# Patient Record
Sex: FEMALE | Race: WHITE | Employment: PART TIME | URBAN - METROPOLITAN AREA
[De-identification: names, ages, dates, MRNs, and addresses within clinical notes are randomized per-mention and may not be internally consistent; named-entity substitution may affect disease eponyms.]

---

## 2017-05-04 ENCOUNTER — TELEPHONE (OUTPATIENT)
Dept: FAMILY MEDICINE CLINIC | Facility: CLINIC | Age: 52
End: 2017-05-04

## 2017-05-04 DIAGNOSIS — Z00.00 ROUTINE ADULT HEALTH MAINTENANCE: Primary | ICD-10-CM

## 2017-05-04 NOTE — TELEPHONE ENCOUNTER
Patient is calling in she has a physical scheduled for 5/17/17 with Dr. Guzman Burkett and needs labs orders called into [facility].

## 2017-05-04 NOTE — TELEPHONE ENCOUNTER
Pt does not have physical but does still want all labs.   Pt states visit was to be a physical not just 6 mo cholesterol visit

## 2017-05-17 ENCOUNTER — OFFICE VISIT (OUTPATIENT)
Dept: FAMILY MEDICINE CLINIC | Facility: CLINIC | Age: 52
End: 2017-05-17

## 2017-05-17 VITALS
TEMPERATURE: 98 F | RESPIRATION RATE: 16 BRPM | HEART RATE: 80 BPM | WEIGHT: 182 LBS | HEIGHT: 64 IN | BODY MASS INDEX: 31.07 KG/M2 | SYSTOLIC BLOOD PRESSURE: 124 MMHG | DIASTOLIC BLOOD PRESSURE: 72 MMHG

## 2017-05-17 DIAGNOSIS — E78.2 MIXED HYPERLIPIDEMIA: Primary | ICD-10-CM

## 2017-05-17 PROCEDURE — 99213 OFFICE O/P EST LOW 20 MIN: CPT | Performed by: FAMILY MEDICINE

## 2017-05-17 NOTE — PROGRESS NOTES
HPI:   Patient presents with:  Hyperlipidemia: 6 month f/u   Colonoscopy Screening: pt is due  Orders Call: pt due for mammogram      Derick Boss is a 46year old female who presents for recheck of her hypertension.  She has been taking medications a daughter. She has No Known Allergies.        REVIEW OF SYSTEMS:   GENERAL HEALTH: feels well otherwise  SKIN: denies any unusual skin lesions or rashes  RESPIRATORY: denies shortness of breath with exertion  CARDIOVASCULAR: denies chest pain on exertion CPX    Return in about 6 months (around 11/17/2017) for Annual physical.    Diana Cross, 5/17/2017  10:04 AM

## 2017-05-17 NOTE — PATIENT INSTRUCTIONS
Telephone on 05/04/2017  GLUCOSE Date: 05/10/2017   Value: 90  Ref Range 65 - 99 mg/dL Status: Final    Comment:               Fasting reference interval        UREA NITROGEN (BUN) Date: 05/10/2017   Value: 22  Ref Range 7 - 25 mg/dL Status: Final   CREA Range 125 - 200 mg/dL Status: Final   HDL CHOLESTEROL Date: 05/10/2017   Value: 59  Ref Range > OR = 46 mg/dL Status: Final   TRIGLYCERIDES Date: 05/10/2017   Value: 70  Ref Range <150 mg/dL Status: Final   LDL-CHOLESTEROL Date: 05/10/2017   Value: 115  Re BASOPHILS Date: 05/10/2017   Value: 34  Ref Range 0 - 200 cells/uL Status: Final   NEUTROPHILS Date: 05/10/2017   Value: 60.3  Status: Final   LYMPHOCYTES Date: 05/10/2017   Value: 28.8  Status: Final   MONOCYTES Date: 05/10/2017   Value: 5.4  Status: Meme Lyons

## 2017-11-16 ENCOUNTER — TELEPHONE (OUTPATIENT)
Dept: FAMILY MEDICINE CLINIC | Facility: CLINIC | Age: 52
End: 2017-11-16

## 2017-11-16 DIAGNOSIS — Z00.00 LABORATORY EXAMINATION ORDERED AS PART OF A ROUTINE GENERAL MEDICAL EXAMINATION: Primary | ICD-10-CM

## 2017-12-19 ENCOUNTER — TELEPHONE (OUTPATIENT)
Dept: FAMILY MEDICINE CLINIC | Facility: CLINIC | Age: 52
End: 2017-12-19

## 2018-01-11 ENCOUNTER — HOSPITAL (OUTPATIENT)
Dept: OTHER | Age: 53
End: 2018-01-11
Attending: OBSTETRICS & GYNECOLOGY

## 2018-01-25 ENCOUNTER — OFFICE VISIT (OUTPATIENT)
Dept: FAMILY MEDICINE CLINIC | Facility: CLINIC | Age: 53
End: 2018-01-25

## 2018-01-25 VITALS
BODY MASS INDEX: 31.92 KG/M2 | RESPIRATION RATE: 14 BRPM | HEIGHT: 64.25 IN | HEART RATE: 72 BPM | DIASTOLIC BLOOD PRESSURE: 70 MMHG | SYSTOLIC BLOOD PRESSURE: 120 MMHG | WEIGHT: 187 LBS | TEMPERATURE: 98 F

## 2018-01-25 DIAGNOSIS — Z00.00 ANNUAL PHYSICAL EXAM: Primary | ICD-10-CM

## 2018-01-25 DIAGNOSIS — E78.2 MIXED HYPERLIPIDEMIA: ICD-10-CM

## 2018-01-25 DIAGNOSIS — Z12.11 SCREEN FOR COLON CANCER: ICD-10-CM

## 2018-01-25 PROCEDURE — 99396 PREV VISIT EST AGE 40-64: CPT | Performed by: FAMILY MEDICINE

## 2018-01-25 NOTE — PROGRESS NOTES
Jennifer Anderson is a 46year old female who presents for a complete physical exam.   HPI:   Patient presents with:  Physical: WWE no pap      Her last annual assessment has been over 1 year: Annual Physical due on 08/28/2015          Symptoms: is menopa No results found for: A1C, VITD       Last [place] category :Mammogram,1 Yr due on 01/01/2019   [unfilled]  No results found.         Current Outpatient Prescriptions on File Prior to Visit:  Cod Liver Oil 1000 MG Oral Cap Take by mouth 2 (two) times d Date)   BMI 31.85 kg/m²  Estimated body mass index is 31.85 kg/m² as calculated from the following:    Height as of this encounter: 64.25\". Weight as of this encounter: 187 lb. Physical Exam   Nursing note and vitals reviewed.    Constitutional: She i clubbing. Psychiatric: She has a normal mood and affect.  Her speech is normal and behavior is normal. Judgment and thought content normal. Cognition and memory are normal.        ASSESSMENT AND PLAN:   Jaleesa Puga is a 46year old female who prese

## 2018-07-18 ENCOUNTER — TELEPHONE (OUTPATIENT)
Dept: FAMILY MEDICINE CLINIC | Facility: CLINIC | Age: 53
End: 2018-07-18

## 2018-07-19 NOTE — TELEPHONE ENCOUNTER
Called and talked to patient she is feeling better now not wheezing anymore and the burning has decreased she feels it might be from working a double yesterday, she will call if this comes back

## 2018-10-11 ENCOUNTER — TELEPHONE (OUTPATIENT)
Dept: FAMILY MEDICINE CLINIC | Facility: CLINIC | Age: 53
End: 2018-10-11

## 2018-10-11 NOTE — TELEPHONE ENCOUNTER
Pt made physical appt and would like a blood work order sent to [address] [place] [address].

## 2019-01-09 ENCOUNTER — TELEPHONE (OUTPATIENT)
Dept: FAMILY MEDICINE CLINIC | Facility: CLINIC | Age: 54
End: 2019-01-09

## 2019-01-09 DIAGNOSIS — Z00.00 LABORATORY EXAMINATION ORDERED AS PART OF A ROUTINE GENERAL MEDICAL EXAMINATION: ICD-10-CM

## 2019-01-09 DIAGNOSIS — Z12.31 VISIT FOR SCREENING MAMMOGRAM: ICD-10-CM

## 2019-01-09 NOTE — TELEPHONE ENCOUNTER
Please enter lab orders for the patient's upcoming physical appointment. Physical scheduled:    Your appointments     Date & Time Appointment Department [facility])    Jan 30, 2019  9:30 AM CST Physical - Established Patient with MD Riki John

## 2019-01-24 LAB
ALBUMIN/GLOBULIN RATIO: 1.6 (CALC) (ref 1–2.5)
ALBUMIN: 4.5 G/DL (ref 3.6–5.1)
ALKALINE PHOSPHATASE: 56 U/L (ref 33–130)
ALT: 9 U/L (ref 6–29)
AST: 12 U/L (ref 10–35)
BILIRUBIN, TOTAL: 0.6 MG/DL (ref 0.2–1.2)
BUN: 19 MG/DL (ref 7–25)
CALCIUM: 9.7 MG/DL (ref 8.6–10.4)
CARBON DIOXIDE: 29 MMOL/L (ref 20–32)
CHLORIDE: 103 MMOL/L (ref 98–110)
CHOL/HDLC RATIO: 3.1 (CALC)
CHOLESTEROL, TOTAL: 194 MG/DL
CREATININE: 0.76 MG/DL (ref 0.5–1.05)
EGFR IF AFRICN AM: 104 ML/MIN/1.73M2
EGFR IF NONAFRICN AM: 90 ML/MIN/1.73M2
GLOBULIN: 2.8 G/DL (CALC) (ref 1.9–3.7)
GLUCOSE: 87 MG/DL (ref 65–99)
HDL CHOLESTEROL: 63 MG/DL
HEMATOCRIT: 41.9 % (ref 35–45)
HEMOGLOBIN: 13.7 G/DL (ref 11.7–15.5)
LDL-CHOLESTEROL: 115 MG/DL (CALC)
MCH: 28.6 PG (ref 27–33)
MCHC: 32.7 G/DL (ref 32–36)
MCV: 87.5 FL (ref 80–100)
NON-HDL CHOLESTEROL: 131 MG/DL (CALC)
POTASSIUM: 4.6 MMOL/L (ref 3.5–5.3)
PROTEIN, TOTAL: 7.3 G/DL (ref 6.1–8.1)
RDW: 12.7 % (ref 11–15)
RED BLOOD CELL COUNT: 4.79 MILLION/UL (ref 3.8–5.1)
SODIUM: 140 MMOL/L (ref 135–146)
TRIGLYCERIDES: 70 MG/DL
TSH W/REFLEX TO FT4: 1.9 MIU/L
WHITE BLOOD CELL COUNT: 5.1 THOUSAND/UL (ref 3.8–10.8)

## 2019-02-01 ENCOUNTER — OFFICE VISIT (OUTPATIENT)
Dept: FAMILY MEDICINE CLINIC | Facility: CLINIC | Age: 54
End: 2019-02-01
Payer: COMMERCIAL

## 2019-02-01 VITALS
DIASTOLIC BLOOD PRESSURE: 70 MMHG | BODY MASS INDEX: 29.68 KG/M2 | RESPIRATION RATE: 12 BRPM | TEMPERATURE: 97 F | SYSTOLIC BLOOD PRESSURE: 122 MMHG | HEART RATE: 76 BPM | WEIGHT: 176 LBS | HEIGHT: 64.5 IN

## 2019-02-01 DIAGNOSIS — E78.2 MIXED HYPERLIPIDEMIA: ICD-10-CM

## 2019-02-01 DIAGNOSIS — Z00.00 ANNUAL PHYSICAL EXAM: Primary | ICD-10-CM

## 2019-02-01 PROCEDURE — 99396 PREV VISIT EST AGE 40-64: CPT | Performed by: FAMILY MEDICINE

## 2019-02-01 NOTE — PROGRESS NOTES
Pelon Hartley is a 48year old female who presents for a complete physical exam.   HPI:   Patient presents with:  Physical: no pap      Her last annual assessment has been over 1 year: Annual Physical due on 01/25/2019          Symptoms: is menopausal No results found for: A1C, VITD       Last [place] category :Mammogram due on 01/01/2019   [unfilled]  No results found. Current Outpatient Medications on File Prior to Visit:  Cod Liver Oil 1000 MG Oral Cap Take by mouth 2 (two) times daily. as calculated from the following:    Height as of this encounter: 64.5\". Weight as of this encounter: 176 lb. Physical Exam   Nursing note and vitals reviewed. Constitutional: She is oriented to person, place, and time.  Vital signs are normal. She is normal and behavior is normal. Judgment and thought content normal. Cognition and memory are normal.        ASSESSMENT AND PLAN:   Renetta Perez is a 48year old female who presents for a complete physical exam.   Pt's weight is Body mass index is

## 2019-03-06 ENCOUNTER — HOSPITAL (OUTPATIENT)
Dept: OTHER | Age: 54
End: 2019-03-06
Attending: OBSTETRICS & GYNECOLOGY

## 2020-01-01 ENCOUNTER — EXTERNAL RECORD (OUTPATIENT)
Dept: HEALTH INFORMATION MANAGEMENT | Facility: OTHER | Age: 55
End: 2020-01-01

## 2020-06-05 ENCOUNTER — TELEPHONE (OUTPATIENT)
Dept: FAMILY MEDICINE CLINIC | Facility: CLINIC | Age: 55
End: 2020-06-05

## 2020-06-05 DIAGNOSIS — Z00.00 LABORATORY EXAMINATION ORDERED AS PART OF A ROUTINE GENERAL MEDICAL EXAMINATION: ICD-10-CM

## 2020-06-05 DIAGNOSIS — Z12.31 VISIT FOR SCREENING MAMMOGRAM: ICD-10-CM

## 2020-06-05 NOTE — TELEPHONE ENCOUNTER
Diagnoses and all orders for this visit:    Visit for screening mammogram  -     [facility] SCREENING BILAT (CPT=77067);  Future    Laboratory examination ordered as part of a routine general medical examination  -     COMP METABOLIC PANEL (14)  -     LIPID PANEL

## 2020-06-05 NOTE — TELEPHONE ENCOUNTER
Please enter lab orders for the patient's upcoming physical appointment. Physical scheduled:    Your appointments     Date & Time Appointment Department [facility])    Jun 24, 2020 11:00 AM CDT Physical - Established with Juana Schneider MD [facility]

## 2020-06-23 ENCOUNTER — APPOINTMENT (OUTPATIENT)
Dept: MAMMOGRAPHY | Age: 55
End: 2020-06-23
Attending: OBSTETRICS & GYNECOLOGY

## 2020-06-24 ENCOUNTER — OFFICE VISIT (OUTPATIENT)
Dept: FAMILY MEDICINE CLINIC | Facility: CLINIC | Age: 55
End: 2020-06-24
Payer: COMMERCIAL

## 2020-06-24 VITALS
TEMPERATURE: 98 F | SYSTOLIC BLOOD PRESSURE: 110 MMHG | HEART RATE: 60 BPM | WEIGHT: 174 LBS | BODY MASS INDEX: 29.35 KG/M2 | DIASTOLIC BLOOD PRESSURE: 60 MMHG | RESPIRATION RATE: 16 BRPM | HEIGHT: 64.5 IN

## 2020-06-24 DIAGNOSIS — Z00.00 ANNUAL PHYSICAL EXAM: Primary | ICD-10-CM

## 2020-06-24 DIAGNOSIS — E78.2 MIXED HYPERLIPIDEMIA: ICD-10-CM

## 2020-06-24 PROCEDURE — 99396 PREV VISIT EST AGE 40-64: CPT | Performed by: FAMILY MEDICINE

## 2020-06-24 NOTE — PROGRESS NOTES
Wes Petty is a 54year old female who presents for a complete physical exam.   HPI:   Patient presents with:  Physical: pap done with gyne     Her last annual assessment has been over 1 year: Annual Physical due on 02/01/2020          Symptoms: is (H) 06/17/2020 09:11 AM    NONHDLC 151 (H) 06/17/2020 09:11 AM       No results found for: A1C, VITD       Last [place] category :Mammogram due on 01/01/2019   [unfilled]  No results found. Coenzyme Q10 (COQ10 OR), Take by mouth.   Probiotic Product (SC 110/60 (BP Location: Left arm)   Pulse 60   Temp 98.2 °F (36.8 °C)   Resp 16   Ht 64.5\"   Wt 174 lb (78.9 kg)   BMI 29.41 kg/m²  Estimated body mass index is 29.41 kg/m² as calculated from the following:    Height as of this encounter: 64.5\".     Weight a Skin: Skin is warm, dry and intact. No rash noted. She is not diaphoretic. No cyanosis or erythema. Nails show no clubbing. Psychiatric: She has a normal mood and affect.  Her speech is normal and behavior is normal. Judgment and thought content normal.

## 2020-06-27 ENCOUNTER — APPOINTMENT (OUTPATIENT)
Dept: MAMMOGRAPHY | Age: 55
End: 2020-06-27
Attending: OBSTETRICS & GYNECOLOGY

## 2020-07-18 ENCOUNTER — APPOINTMENT (OUTPATIENT)
Dept: MAMMOGRAPHY | Age: 55
End: 2020-07-18
Attending: OBSTETRICS & GYNECOLOGY

## 2020-07-21 ENCOUNTER — PATIENT OUTREACH (OUTPATIENT)
Dept: FAMILY MEDICINE CLINIC | Facility: CLINIC | Age: 55
End: 2020-07-21

## 2020-07-24 ENCOUNTER — TELEPHONE (OUTPATIENT)
Dept: FAMILY MEDICINE CLINIC | Facility: CLINIC | Age: 55
End: 2020-07-24

## 2020-07-24 ENCOUNTER — LAB ENCOUNTER (OUTPATIENT)
Dept: LAB | Facility: HOSPITAL | Age: 55
End: 2020-07-24
Attending: FAMILY MEDICINE
Payer: COMMERCIAL

## 2020-07-24 DIAGNOSIS — Z20.822 ENCOUNTER FOR SCREENING LABORATORY TESTING FOR COVID-19 VIRUS IN ASYMPTOMATIC PATIENT: Primary | ICD-10-CM

## 2020-07-24 DIAGNOSIS — Z20.822 ENCOUNTER FOR SCREENING LABORATORY TESTING FOR COVID-19 VIRUS IN ASYMPTOMATIC PATIENT: ICD-10-CM

## 2020-07-24 NOTE — TELEPHONE ENCOUNTER
Patient recently found out her  tested positive for covid. 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 He didn't have symptoms but is concerned.

## 2020-07-25 LAB — SARS-COV-2 RNA RESP QL NAA+PROBE: NOT DETECTED

## 2020-07-27 ENCOUNTER — HOSPITAL ENCOUNTER (OUTPATIENT)
Dept: MAMMOGRAPHY | Age: 55
Discharge: HOME | End: 2020-07-27
Attending: OBSTETRICS & GYNECOLOGY

## 2020-07-27 DIAGNOSIS — Z12.31 BREAST CANCER SCREENING BY MAMMOGRAM: ICD-10-CM

## 2020-07-27 PROCEDURE — 77067 SCR MAMMO BI INCL CAD: CPT

## 2020-08-04 ENCOUNTER — TELEPHONE (OUTPATIENT)
Dept: FAMILY MEDICINE CLINIC | Facility: CLINIC | Age: 55
End: 2020-08-04

## 2020-08-04 NOTE — TELEPHONE ENCOUNTER
Patient is calling because she has returned back to work after having a negative covid test. Patient is concerned if her manager should have had another test done after testing positive and being off for 14 days.  Patient wants to know if it is safe for her

## 2020-10-14 ENCOUNTER — TELEPHONE (OUTPATIENT)
Dept: FAMILY MEDICINE CLINIC | Facility: CLINIC | Age: 55
End: 2020-10-14

## 2020-10-14 NOTE — TELEPHONE ENCOUNTER
Explained to Pt that our testing for Asymptomatic Pt is 5-7-days, so it would not be sooner. Advised other testing sites. Pt was in last contact with + person 5 days ago. Pt has no symptoms.   COVID-19 Testing:  Here is an updated list of other testing fa [email or web address]   6. [facility] COVID-[facility] Testing: Multiple locations. Appointment required. No doctor's order needed.  Find testing locations and schedule appointment at [employer]-

## 2020-10-14 NOTE — TELEPHONE ENCOUNTER
Pt requesting we place order for Covid test as an employer tested positive.  Was giving her the locations but she thinks it would be faster if we place order

## 2020-10-22 ENCOUNTER — OFFICE VISIT (OUTPATIENT)
Dept: OBGYN | Age: 55
End: 2020-10-22

## 2020-10-22 VITALS
HEIGHT: 65 IN | SYSTOLIC BLOOD PRESSURE: 110 MMHG | BODY MASS INDEX: 29.82 KG/M2 | WEIGHT: 179 LBS | DIASTOLIC BLOOD PRESSURE: 72 MMHG

## 2020-10-22 DIAGNOSIS — Z12.4 SCREENING FOR MALIGNANT NEOPLASM OF CERVIX: Primary | ICD-10-CM

## 2020-10-22 DIAGNOSIS — Z01.419 GYNECOLOGIC EXAM NORMAL: ICD-10-CM

## 2020-10-22 PROCEDURE — 99396 PREV VISIT EST AGE 40-64: CPT | Performed by: OBSTETRICS & GYNECOLOGY

## 2020-10-22 RX ORDER — BACILLUS COAGULANS/INULIN 1B-250 MG
CAPSULE ORAL
COMMUNITY
End: 2023-05-23 | Stop reason: ALTCHOICE

## 2020-10-22 RX ORDER — VITAMIN B COMPLEX
TABLET ORAL DAILY
COMMUNITY
End: 2023-05-23 | Stop reason: ALTCHOICE

## 2020-10-22 RX ORDER — ASCORBIC ACID 1000 MG
TABLET ORAL
COMMUNITY
End: 2023-05-23 | Stop reason: ALTCHOICE

## 2020-10-22 SDOH — HEALTH STABILITY: MENTAL HEALTH: HOW OFTEN DO YOU HAVE A DRINK CONTAINING ALCOHOL?: 2-4 TIMES A MONTH

## 2020-10-22 SDOH — HEALTH STABILITY: PHYSICAL HEALTH: ON AVERAGE, HOW MANY MINUTES DO YOU ENGAGE IN EXERCISE AT THIS LEVEL?: 60 MIN

## 2020-10-22 SDOH — HEALTH STABILITY: PHYSICAL HEALTH: ON AVERAGE, HOW MANY DAYS PER WEEK DO YOU ENGAGE IN MODERATE TO STRENUOUS EXERCISE (LIKE A BRISK WALK)?: 4 DAYS

## 2020-10-22 ASSESSMENT — PATIENT HEALTH QUESTIONNAIRE - PHQ9
CLINICAL INTERPRETATION OF PHQ2 SCORE: NO FURTHER SCREENING NEEDED
SUM OF ALL RESPONSES TO PHQ9 QUESTIONS 1 AND 2: 0
1. LITTLE INTEREST OR PLEASURE IN DOING THINGS: NOT AT ALL
SUM OF ALL RESPONSES TO PHQ9 QUESTIONS 1 AND 2: 0
CLINICAL INTERPRETATION OF PHQ9 SCORE: NO FURTHER SCREENING NEEDED
2. FEELING DOWN, DEPRESSED OR HOPELESS: NOT AT ALL

## 2020-10-25 ASSESSMENT — ENCOUNTER SYMPTOMS
CONSTITUTIONAL NEGATIVE: 1
RESPIRATORY NEGATIVE: 1
HEMATOLOGIC/LYMPHATIC NEGATIVE: 1
ENDOCRINE NEGATIVE: 1

## 2020-10-26 LAB — CYTOLOGY CVX/VAG DOC THIN PREP: NORMAL

## 2020-10-28 ENCOUNTER — VIRTUAL PHONE E/M (OUTPATIENT)
Dept: FAMILY MEDICINE CLINIC | Facility: CLINIC | Age: 55
End: 2020-10-28
Payer: COMMERCIAL

## 2020-10-28 DIAGNOSIS — J02.9 SORE THROAT: ICD-10-CM

## 2020-10-28 DIAGNOSIS — Z20.822 SUSPECTED COVID-19 VIRUS INFECTION: Primary | ICD-10-CM

## 2020-10-28 PROCEDURE — 99213 OFFICE O/P EST LOW 20 MIN: CPT | Performed by: FAMILY MEDICINE

## 2020-10-28 NOTE — PROGRESS NOTES
Virtual Check-In    Santa Li verbally consents to a [employer] on 10/28/20. Patient understands and accepts financial responsibility for any deductible, co-insurance and/or co-pays associated with this service.     Duration of the s

## 2020-10-29 ENCOUNTER — LAB ENCOUNTER (OUTPATIENT)
Dept: LAB | Age: 55
End: 2020-10-29
Attending: FAMILY MEDICINE
Payer: COMMERCIAL

## 2020-10-29 DIAGNOSIS — J02.9 SORE THROAT: ICD-10-CM

## 2020-10-29 DIAGNOSIS — Z20.822 SUSPECTED COVID-19 VIRUS INFECTION: ICD-10-CM

## 2020-11-04 ENCOUNTER — VIRTUAL PHONE E/M (OUTPATIENT)
Dept: FAMILY MEDICINE CLINIC | Facility: CLINIC | Age: 55
End: 2020-11-04
Payer: COMMERCIAL

## 2020-11-04 DIAGNOSIS — U07.1 COVID-19 VIRUS INFECTION: Primary | ICD-10-CM

## 2020-11-04 PROCEDURE — 99213 OFFICE O/P EST LOW 20 MIN: CPT | Performed by: FAMILY MEDICINE

## 2020-11-04 NOTE — PROGRESS NOTES
Virtual Check-In    [place] verbally consents to a [employer] on 11/04/20. Patient understands and accepts financial responsibility for any deductible, co-insurance and/or co-pays associated with this service.     Duration of the s

## 2021-03-23 ENCOUNTER — TELEPHONE (OUTPATIENT)
Dept: FAMILY MEDICINE CLINIC | Facility: CLINIC | Age: 56
End: 2021-03-23

## 2021-03-23 NOTE — TELEPHONE ENCOUNTER
Patient is due for a Pap please call patient and schedule them for an appointment    Thank you.      Routed to [facility]

## 2021-03-29 ENCOUNTER — TELEPHONE (OUTPATIENT)
Dept: FAMILY MEDICINE CLINIC | Facility: CLINIC | Age: 56
End: 2021-03-29

## 2021-03-29 DIAGNOSIS — Z12.31 VISIT FOR SCREENING MAMMOGRAM: ICD-10-CM

## 2021-03-29 DIAGNOSIS — Z00.00 LABORATORY EXAMINATION ORDERED AS PART OF A ROUTINE GENERAL MEDICAL EXAMINATION: ICD-10-CM

## 2021-03-29 NOTE — TELEPHONE ENCOUNTER
Please enter lab orders for the patient's upcoming physical appointment. Physical scheduled:    Your appointments     Date & Time Appointment Department [facility])    Jun 24, 2021 10:00 AM CDT Physical - Established with Minnie García  [address]  ([address])            [address] [place] [address] HEART [facility],  [address]  [place] [phone]         Preferred lab: [facility]     Please notify pt once labs have been placed to remind her

## 2021-03-29 NOTE — TELEPHONE ENCOUNTER
LM to advise labs were placed they are fasting labs to fast for 10-12 hours. Advised on mammogram being ordered as well.

## 2021-03-29 NOTE — TELEPHONE ENCOUNTER
Diagnoses and all orders for this visit:    Visit for screening mammogram  -     [facility] SCREENING BILAT (CPT=77067); Future    Laboratory examination ordered as part of a routine general medical examination  -     COMP METABOLIC PANEL (14)  -     LIPID PANEL  -     CBC, PLATELET; NO DIFFERENTIAL  -     TSH W REFLEX TO FREE T4         OK to notify.  Thanks, Lorna Fernandes MD

## 2021-04-20 ENCOUNTER — TELEPHONE (OUTPATIENT)
Dept: FAMILY MEDICINE CLINIC | Facility: CLINIC | Age: 56
End: 2021-04-20

## 2021-08-11 ENCOUNTER — OFFICE VISIT (OUTPATIENT)
Dept: FAMILY MEDICINE CLINIC | Facility: CLINIC | Age: 56
End: 2021-08-11
Payer: COMMERCIAL

## 2021-08-11 ENCOUNTER — TELEPHONE (OUTPATIENT)
Dept: FAMILY MEDICINE CLINIC | Facility: CLINIC | Age: 56
End: 2021-08-11

## 2021-08-11 VITALS
HEART RATE: 66 BPM | DIASTOLIC BLOOD PRESSURE: 80 MMHG | SYSTOLIC BLOOD PRESSURE: 128 MMHG | WEIGHT: 173.38 LBS | BODY MASS INDEX: 27.86 KG/M2 | RESPIRATION RATE: 12 BRPM | HEIGHT: 66 IN

## 2021-08-11 DIAGNOSIS — E78.2 MIXED HYPERLIPIDEMIA: ICD-10-CM

## 2021-08-11 DIAGNOSIS — Z00.00 ANNUAL PHYSICAL EXAM: Primary | ICD-10-CM

## 2021-08-11 LAB
ALBUMIN/GLOBULIN RATIO: 1.5 (CALC) (ref 1–2.5)
ALBUMIN: 4.5 G/DL (ref 3.6–5.1)
ALKALINE PHOSPHATASE: 57 U/L (ref 37–153)
ALT: 10 U/L (ref 6–29)
AST: 12 U/L (ref 10–35)
BILIRUBIN, TOTAL: 0.8 MG/DL (ref 0.2–1.2)
BUN: 21 MG/DL (ref 7–25)
CALCIUM: 9.5 MG/DL (ref 8.6–10.4)
CARBON DIOXIDE: 28 MMOL/L (ref 20–32)
CHLORIDE: 103 MMOL/L (ref 98–110)
CHOL/HDLC RATIO: 3.4 (CALC)
CHOLESTEROL, TOTAL: 201 MG/DL
CREATININE: 0.75 MG/DL (ref 0.5–1.05)
EGFR IF AFRICN AM: 103 ML/MIN/1.73M2
EGFR IF NONAFRICN AM: 89 ML/MIN/1.73M2
GLOBULIN: 3 G/DL (CALC) (ref 1.9–3.7)
GLUCOSE: 92 MG/DL (ref 65–99)
HDL CHOLESTEROL: 59 MG/DL
HEMATOCRIT: 40.5 % (ref 35–45)
HEMOGLOBIN: 13.2 G/DL (ref 11.7–15.5)
LDL-CHOLESTEROL: 122 MG/DL (CALC)
MCH: 28.6 PG (ref 27–33)
MCHC: 32.6 G/DL (ref 32–36)
MCV: 87.7 FL (ref 80–100)
NON-HDL CHOLESTEROL: 142 MG/DL (CALC)
POTASSIUM: 5 MMOL/L (ref 3.5–5.3)
PROTEIN, TOTAL: 7.5 G/DL (ref 6.1–8.1)
RDW: 13.1 % (ref 11–15)
RED BLOOD CELL COUNT: 4.62 MILLION/UL (ref 3.8–5.1)
SODIUM: 141 MMOL/L (ref 135–146)
TRIGLYCERIDES: 98 MG/DL
TSH W/REFLEX TO FT4: 3 MIU/L (ref 0.4–4.5)
WHITE BLOOD CELL COUNT: 5.3 THOUSAND/UL (ref 3.8–10.8)

## 2021-08-11 PROCEDURE — 3074F SYST BP LT 130 MM HG: CPT | Performed by: FAMILY MEDICINE

## 2021-08-11 PROCEDURE — 3008F BODY MASS INDEX DOCD: CPT | Performed by: FAMILY MEDICINE

## 2021-08-11 PROCEDURE — 99396 PREV VISIT EST AGE 40-64: CPT | Performed by: FAMILY MEDICINE

## 2021-08-11 PROCEDURE — 3079F DIAST BP 80-89 MM HG: CPT | Performed by: FAMILY MEDICINE

## 2021-08-11 NOTE — PROGRESS NOTES
Barb Bone is a 64year old female who presents for a complete physical exam.     had concerns including Physical (WWE, see's gyn, no pap ) and Follow Up To Mammogram (wants to discuss on how she can get that scheduled ).    Her last annual assessme 07/27/2021   [unfilled]   No results found for this or any previous visit.      Colonoscopy due on 04/12/2028   No recommendations at this time   COVID-19 Vaccine(1) Never done  Zoster Vaccines(1 of 2) Never done  Annual Depression Screen due on 06/24/2021 level: Not on file    Occupational History      Occupation:         Employer: [employer]        Comment: [email or web address]    Tobacco Use      Smoking status: Never Smoker      Smokeless tobacco: Never Used    Vaping Use      Vaping Use Normal heart sounds, S1 normal and S2 normal. No murmur heard. Pulmonary:      Effort: Pulmonary effort is normal.      Breath sounds: Normal breath sounds. Abdominal:      General: Abdomen is flat. Bowel sounds are normal. There is no distension. Cholecalciferol.    Return in about 1 year (around 8/11/2022) for Annual physical.

## 2021-08-11 NOTE — TELEPHONE ENCOUNTER
Please enter lab orders for the patient's upcoming physical appointment. Physical scheduled:    Your appointments     Date & Time Appointment Department [facility])    Aug 10, 2022  9:30 AM CDT Physical - Established with Juli Martines  [facility]

## 2021-08-16 ENCOUNTER — TELEPHONE (OUTPATIENT)
Dept: FAMILY MEDICINE CLINIC | Facility: CLINIC | Age: 56
End: 2021-08-16

## 2021-08-16 ENCOUNTER — OFFICE VISIT (OUTPATIENT)
Dept: FAMILY MEDICINE CLINIC | Facility: CLINIC | Age: 56
End: 2021-08-16
Payer: COMMERCIAL

## 2021-08-16 VITALS — HEIGHT: 66 IN | WEIGHT: 173 LBS | BODY MASS INDEX: 27.8 KG/M2

## 2021-08-16 DIAGNOSIS — R39.9 UTI SYMPTOMS: Primary | ICD-10-CM

## 2021-08-16 LAB
APPEARANCE: CLEAR
BILIRUBIN: NEGATIVE
GLUCOSE (URINE DIPSTICK): NEGATIVE MG/DL
KETONES (URINE DIPSTICK): NEGATIVE MG/DL
MULTISTIX LOT#: ABNORMAL NUMERIC
NITRITE, URINE: NEGATIVE
PH, URINE: 7 (ref 4.5–8)
PROTEIN (URINE DIPSTICK): NEGATIVE MG/DL
SPECIFIC GRAVITY: 1.01 (ref 1–1.03)
URINE-COLOR: YELLOW
UROBILINOGEN,SEMI-QN: 0.2 MG/DL (ref 0–1.9)

## 2021-08-16 PROCEDURE — 99213 OFFICE O/P EST LOW 20 MIN: CPT | Performed by: FAMILY MEDICINE

## 2021-08-16 PROCEDURE — 3008F BODY MASS INDEX DOCD: CPT | Performed by: FAMILY MEDICINE

## 2021-08-16 PROCEDURE — 81003 URINALYSIS AUTO W/O SCOPE: CPT | Performed by: FAMILY MEDICINE

## 2021-08-16 RX ORDER — SULFAMETHOXAZOLE AND TRIMETHOPRIM 800; 160 MG/1; MG/1
1 TABLET ORAL 2 TIMES DAILY
Qty: 14 TABLET | Refills: 0 | Status: SHIPPED | OUTPATIENT
Start: 2021-08-16 | End: 2021-08-23

## 2021-08-16 NOTE — TELEPHONE ENCOUNTER
Pt c/o urinary burning, pressure after urinating. Urine appears cloudy.     Future Appointments   Date Time Provider [facility]   8/16/2021  1:45 PM Aileen Parisi MD [facility]   8/10/2022  9:30 AM Aileen Parisi MD [facility]

## 2021-08-16 NOTE — PROGRESS NOTES
Gaston Goyal is a 64year old female coming in for had concerns including Urinary (patient says it burning, says urine it was cloudy, just wants to check and see if its a possible UTI). HPI/Subjective:   Urinary  This is a new problem.  The current episode start

## 2021-08-18 ENCOUNTER — TELEPHONE (OUTPATIENT)
Dept: FAMILY MEDICINE CLINIC | Facility: CLINIC | Age: 56
End: 2021-08-18

## 2021-08-18 NOTE — TELEPHONE ENCOUNTER
Reviewed results/recopmmednations with Pt.  Explained we will call her if any change is needed with antibiotic

## 2021-08-18 NOTE — TELEPHONE ENCOUNTER
I have prescribed Bactrim at your recent visit. It is coming back as a positive infection but sensitivities are not back yet so okay to start the medication.

## 2021-08-18 NOTE — TELEPHONE ENCOUNTER
Pt did  a Urine test to determine how to take her medication and is awaiting on her results to see what to do? Informed that no results in yet.  Please call when results are in please

## 2021-10-28 ENCOUNTER — APPOINTMENT (OUTPATIENT)
Dept: OBGYN | Age: 56
End: 2021-10-28

## 2022-05-12 ENCOUNTER — OFFICE VISIT (OUTPATIENT)
Dept: OBGYN | Age: 57
End: 2022-05-12

## 2022-05-12 VITALS
DIASTOLIC BLOOD PRESSURE: 82 MMHG | HEART RATE: 72 BPM | SYSTOLIC BLOOD PRESSURE: 150 MMHG | TEMPERATURE: 97.6 F | BODY MASS INDEX: 29.57 KG/M2 | WEIGHT: 184 LBS | HEIGHT: 66 IN

## 2022-05-12 DIAGNOSIS — Z12.4 CERVICAL CANCER SCREENING: ICD-10-CM

## 2022-05-12 DIAGNOSIS — Z01.419 WOMEN'S ANNUAL ROUTINE GYNECOLOGICAL EXAMINATION: Primary | ICD-10-CM

## 2022-05-12 DIAGNOSIS — R03.0 ELEVATED BLOOD-PRESSURE READING WITHOUT DIAGNOSIS OF HYPERTENSION: ICD-10-CM

## 2022-05-12 PROCEDURE — 99396 PREV VISIT EST AGE 40-64: CPT | Performed by: OBSTETRICS & GYNECOLOGY

## 2022-05-12 RX ORDER — CALCIUM CARBONATE 500(1250)
1000 TABLET ORAL
COMMUNITY
End: 2023-05-23 | Stop reason: ALTCHOICE

## 2022-05-12 ASSESSMENT — PATIENT HEALTH QUESTIONNAIRE - PHQ9
SUM OF ALL RESPONSES TO PHQ9 QUESTIONS 1 AND 2: 0
CLINICAL INTERPRETATION OF PHQ2 SCORE: NO FURTHER SCREENING NEEDED
1. LITTLE INTEREST OR PLEASURE IN DOING THINGS: NOT AT ALL
SUM OF ALL RESPONSES TO PHQ9 QUESTIONS 1 AND 2: 0
2. FEELING DOWN, DEPRESSED OR HOPELESS: NOT AT ALL

## 2022-06-02 ENCOUNTER — APPOINTMENT (OUTPATIENT)
Dept: MAMMOGRAPHY | Age: 57
End: 2022-06-02
Attending: OBSTETRICS & GYNECOLOGY

## 2022-06-13 ENCOUNTER — HOSPITAL ENCOUNTER (OUTPATIENT)
Dept: MAMMOGRAPHY | Age: 57
Discharge: HOME | End: 2022-06-13
Attending: OBSTETRICS & GYNECOLOGY

## 2022-06-13 DIAGNOSIS — Z12.31 VISIT FOR SCREENING MAMMOGRAM: ICD-10-CM

## 2022-06-13 PROCEDURE — 77063 BREAST TOMOSYNTHESIS BI: CPT

## 2022-08-05 LAB
ALBUMIN/GLOBULIN RATIO: 1.5 (CALC) (ref 1–2.5)
ALBUMIN: 4.6 G/DL (ref 3.6–5.1)
ALKALINE PHOSPHATASE: 64 U/L (ref 37–153)
ALT: 13 U/L (ref 6–29)
AST: 15 U/L (ref 10–35)
BILIRUBIN, TOTAL: 0.6 MG/DL (ref 0.2–1.2)
BUN: 22 MG/DL (ref 7–25)
CALCIUM: 9.9 MG/DL (ref 8.6–10.4)
CARBON DIOXIDE: 29 MMOL/L (ref 20–32)
CHLORIDE: 103 MMOL/L (ref 98–110)
CHOL/HDLC RATIO: 3.6 (CALC)
CHOLESTEROL, TOTAL: 231 MG/DL
CREATININE: 0.8 MG/DL (ref 0.5–1.03)
EGFR: 86 ML/MIN/1.73M2
GLOBULIN: 3 G/DL (CALC) (ref 1.9–3.7)
GLUCOSE: 88 MG/DL (ref 65–99)
HDL CHOLESTEROL: 64 MG/DL
HEMATOCRIT: 40.6 % (ref 35–45)
HEMOGLOBIN: 13.2 G/DL (ref 11.7–15.5)
LDL-CHOLESTEROL: 145 MG/DL (CALC)
MCH: 28.6 PG (ref 27–33)
MCHC: 32.5 G/DL (ref 32–36)
MCV: 88.1 FL (ref 80–100)
MPV: 10.8 FL (ref 7.5–12.5)
NON-HDL CHOLESTEROL: 167 MG/DL (CALC)
PLATELET COUNT: 135 THOUSAND/UL (ref 140–400)
POTASSIUM: 5 MMOL/L (ref 3.5–5.3)
PROTEIN, TOTAL: 7.6 G/DL (ref 6.1–8.1)
RDW: 13.2 % (ref 11–15)
RED BLOOD CELL COUNT: 4.61 MILLION/UL (ref 3.8–5.1)
SODIUM: 141 MMOL/L (ref 135–146)
TRIGLYCERIDES: 108 MG/DL
TSH W/REFLEX TO FT4: 2.49 MIU/L (ref 0.4–4.5)
WHITE BLOOD CELL COUNT: 4.7 THOUSAND/UL (ref 3.8–10.8)

## 2022-08-10 ENCOUNTER — OFFICE VISIT (OUTPATIENT)
Dept: FAMILY MEDICINE CLINIC | Facility: CLINIC | Age: 57
End: 2022-08-10
Payer: COMMERCIAL

## 2022-08-10 VITALS
WEIGHT: 183.38 LBS | HEIGHT: 66 IN | SYSTOLIC BLOOD PRESSURE: 128 MMHG | BODY MASS INDEX: 29.47 KG/M2 | HEART RATE: 74 BPM | RESPIRATION RATE: 18 BRPM | DIASTOLIC BLOOD PRESSURE: 80 MMHG

## 2022-08-10 DIAGNOSIS — E78.2 MIXED HYPERLIPIDEMIA: ICD-10-CM

## 2022-08-10 DIAGNOSIS — Z00.00 ANNUAL PHYSICAL EXAM: Primary | ICD-10-CM

## 2022-08-10 PROCEDURE — 3074F SYST BP LT 130 MM HG: CPT | Performed by: FAMILY MEDICINE

## 2022-08-10 PROCEDURE — 99396 PREV VISIT EST AGE 40-64: CPT | Performed by: FAMILY MEDICINE

## 2022-08-10 PROCEDURE — 3008F BODY MASS INDEX DOCD: CPT | Performed by: FAMILY MEDICINE

## 2022-08-10 PROCEDURE — 3079F DIAST BP 80-89 MM HG: CPT | Performed by: FAMILY MEDICINE

## 2022-11-16 ENCOUNTER — TELEPHONE (OUTPATIENT)
Dept: FAMILY MEDICINE CLINIC | Facility: CLINIC | Age: 57
End: 2022-11-16

## 2022-11-16 LAB
ALBUMIN/GLOBULIN RATIO: 1.5 (CALC) (ref 1–2.5)
ALBUMIN: 4.7 G/DL (ref 3.6–5.1)
ALKALINE PHOSPHATASE: 64 U/L (ref 37–153)
ALT: 9 U/L (ref 6–29)
AST: 12 U/L (ref 10–35)
BILIRUBIN, TOTAL: 0.8 MG/DL (ref 0.2–1.2)
BUN: 23 MG/DL (ref 7–25)
CALCIUM: 10.1 MG/DL (ref 8.6–10.4)
CARBON DIOXIDE: 30 MMOL/L (ref 20–32)
CHLORIDE: 104 MMOL/L (ref 98–110)
CHOL/HDLC RATIO: 3.3 (CALC)
CHOLESTEROL, TOTAL: 231 MG/DL
CREATININE: 0.81 MG/DL (ref 0.5–1.03)
EGFR: 85 ML/MIN/1.73M2
GLOBULIN: 3.1 G/DL (CALC) (ref 1.9–3.7)
GLUCOSE: 93 MG/DL (ref 65–99)
HDL CHOLESTEROL: 71 MG/DL
LDL-CHOLESTEROL: 136 MG/DL (CALC)
NON-HDL CHOLESTEROL: 160 MG/DL (CALC)
POTASSIUM: 5.7 MMOL/L (ref 3.5–5.3)
PROTEIN, TOTAL: 7.8 G/DL (ref 6.1–8.1)
SODIUM: 142 MMOL/L (ref 135–146)
TRIGLYCERIDES: 118 MG/DL

## 2022-11-19 ENCOUNTER — HOSPITAL ENCOUNTER (EMERGENCY)
Age: 57
Discharge: HOME | End: 2022-11-19
Attending: EMERGENCY MEDICINE
Payer: COMMERCIAL

## 2022-11-19 VITALS
OXYGEN SATURATION: 97 % | BODY MASS INDEX: 28.13 KG/M2 | DIASTOLIC BLOOD PRESSURE: 78 MMHG | HEIGHT: 66 IN | WEIGHT: 175 LBS | RESPIRATION RATE: 18 BRPM | HEART RATE: 71 BPM | TEMPERATURE: 98 F | SYSTOLIC BLOOD PRESSURE: 160 MMHG

## 2022-11-19 DIAGNOSIS — S43.401A SPRAIN OF RIGHT SHOULDER, UNSPECIFIED SHOULDER SPRAIN TYPE, INITIAL ENCOUNTER: Primary | ICD-10-CM

## 2022-11-19 PROCEDURE — 99282 EMERGENCY DEPT VISIT SF MDM: CPT

## 2022-11-19 NOTE — DISCHARGE INSTRUCTIONS
Sling for comfort    Motrin every 6 hours for pain    Recommend orthopedic follow-up in 2 weeks if no improvement    Ice 3 times daily

## 2022-11-19 NOTE — ED INITIAL ASSESSMENT (HPI)
Right shoulder pain that's progressively gotten worse since Friday morning. No falls or trauma. Pt sts she is a  and thinks she might've aggravated it last night at work.  Pt took 600mg Motrin prior to arrival.

## 2023-02-06 ENCOUNTER — TELEPHONE (OUTPATIENT)
Dept: FAMILY MEDICINE CLINIC | Facility: CLINIC | Age: 58
End: 2023-02-06

## 2023-02-07 NOTE — TELEPHONE ENCOUNTER
Pt's boyfriend tested positive for H Pylori and Pt concerned. Pt has no symptoms. Explained that usually transmitted through stool, vomit. Reviewed with Dr. Magdiel Fung and she states can possibly be transmitted through kissing but if no symptoms we do not treat but if Pt really concerned she can get tested.   Reviewed with Pt and Pt will keep us updated if any symptoms develop or if wants testing

## 2023-02-13 ENCOUNTER — TELEPHONE (OUTPATIENT)
Dept: FAMILY MEDICINE CLINIC | Facility: CLINIC | Age: 58
End: 2023-02-13

## 2023-02-13 NOTE — TELEPHONE ENCOUNTER
Last potassium 11/21/22- 4.8  Advised to recheck in 4-6- months. Next appt 8/16/23- can she wait to get labs done closer to physical appt?   What labs will she need prior to physical?

## 2023-02-13 NOTE — TELEPHONE ENCOUNTER
Pt is calling because in her My Chart it says to recheck her Potassium in 4-6 months and she went back 2 weeks later and it was normal and wants to know if its ok for her to wait or if she can wait until her physical

## 2023-02-13 NOTE — TELEPHONE ENCOUNTER
Please enter lab orders for the patient's upcoming physical appointment. Physical scheduled: Your appointments     Date & Time Appointment Department [facility])    Aug 16, 2023 11:00 AM CDT Physical - Established with Leon Huynh MD [address], [address] [address], [place] ([place])            [place] [address] [facility] [address] [phone]         Preferred lab: [facility]     The patient has been notified to complete fasting labs prior to their physical appointment.

## 2023-02-14 NOTE — TELEPHONE ENCOUNTER
Potassium was 5.7 on November 15. Likely had been sitting around all day and hemolyzed releasing the potassium. The repeat was 4.8. A true elevation would not have reduced that quickly.   Therefore she can wait till August.  Thanks Moderna dose 1 and 2

## 2023-05-23 ENCOUNTER — OFFICE VISIT (OUTPATIENT)
Dept: OBGYN | Age: 58
End: 2023-05-23

## 2023-05-23 VITALS
DIASTOLIC BLOOD PRESSURE: 86 MMHG | HEIGHT: 66 IN | SYSTOLIC BLOOD PRESSURE: 155 MMHG | WEIGHT: 176.6 LBS | BODY MASS INDEX: 28.38 KG/M2

## 2023-05-23 DIAGNOSIS — R03.0 ELEVATED BLOOD-PRESSURE READING WITHOUT DIAGNOSIS OF HYPERTENSION: ICD-10-CM

## 2023-05-23 DIAGNOSIS — Z01.419 WOMEN'S ANNUAL ROUTINE GYNECOLOGICAL EXAMINATION: Primary | ICD-10-CM

## 2023-05-23 DIAGNOSIS — Z12.4 CERVICAL CANCER SCREENING: ICD-10-CM

## 2023-05-23 PROCEDURE — 87624 HPV HI-RISK TYP POOLED RSLT: CPT | Performed by: INTERNAL MEDICINE

## 2023-05-23 PROCEDURE — 99396 PREV VISIT EST AGE 40-64: CPT | Performed by: OBSTETRICS & GYNECOLOGY

## 2023-05-23 PROCEDURE — 88175 CYTOPATH C/V AUTO FLUID REDO: CPT | Performed by: INTERNAL MEDICINE

## 2023-05-23 RX ORDER — MULTIVIT WITH MINERALS/LUTEIN
1000 TABLET ORAL DAILY
COMMUNITY

## 2023-05-23 ASSESSMENT — PATIENT HEALTH QUESTIONNAIRE - PHQ9
CLINICAL INTERPRETATION OF PHQ2 SCORE: NO FURTHER SCREENING NEEDED
SUM OF ALL RESPONSES TO PHQ9 QUESTIONS 1 AND 2: 0
1. LITTLE INTEREST OR PLEASURE IN DOING THINGS: NOT AT ALL
SUM OF ALL RESPONSES TO PHQ9 QUESTIONS 1 AND 2: 0
2. FEELING DOWN, DEPRESSED OR HOPELESS: NOT AT ALL

## 2023-05-23 ASSESSMENT — PAIN SCALES - GENERAL: PAINLEVEL: 0

## 2023-05-31 LAB
CASE RPRT: ABNORMAL
CLINICAL INFO: ABNORMAL
CYTOLOGY CVX/VAG STUDY: ABNORMAL
CYTOLOGY CVX/VAG STUDY: ABNORMAL
GEN CATEG CVX/VAG CYTO-IMP: ABNORMAL
HPV16+18+45 E6+E7MRNA CVX NAA+PROBE: NEGATIVE
Lab: NORMAL
PAP EDUCATIONAL NOTE: ABNORMAL
SPECIMEN ADEQUACY: ABNORMAL

## 2023-06-20 ENCOUNTER — HOSPITAL ENCOUNTER (OUTPATIENT)
Dept: MAMMOGRAPHY | Age: 58
Discharge: HOME | End: 2023-06-20
Attending: OBSTETRICS & GYNECOLOGY

## 2023-06-20 DIAGNOSIS — Z12.31 VISIT FOR SCREENING MAMMOGRAM: ICD-10-CM

## 2023-06-20 PROCEDURE — 77063 BREAST TOMOSYNTHESIS BI: CPT

## 2023-08-15 NOTE — ASSESSMENT & PLAN NOTE
Cholesterol shows Good control. Long term heart-healthy diet and lifestyle discussed and encouraged to reduce risk of cardiovascular disease. Cholesterol: 231, done on 11/15/2022. HDL Cholesterol: 71, done on 11/15/2022. TriGlycerides 118, done on 11/15/2022. LDL Cholesterol: 136, done on 11/15/2022. No current Cholesterol medication.

## 2023-08-16 ENCOUNTER — OFFICE VISIT (OUTPATIENT)
Dept: FAMILY MEDICINE CLINIC | Facility: CLINIC | Age: 58
End: 2023-08-16
Payer: COMMERCIAL

## 2023-08-16 VITALS
HEIGHT: 66 IN | RESPIRATION RATE: 16 BRPM | SYSTOLIC BLOOD PRESSURE: 122 MMHG | BODY MASS INDEX: 28.28 KG/M2 | WEIGHT: 176 LBS | DIASTOLIC BLOOD PRESSURE: 72 MMHG | HEART RATE: 78 BPM

## 2023-08-16 DIAGNOSIS — E78.2 MIXED HYPERLIPIDEMIA: ICD-10-CM

## 2023-08-16 DIAGNOSIS — Z00.00 ANNUAL PHYSICAL EXAM: Primary | ICD-10-CM

## 2023-08-16 LAB
ALBUMIN/GLOBULIN RATIO: 1.5 (CALC) (ref 1–2.5)
ALBUMIN: 4.4 G/DL (ref 3.6–5.1)
ALKALINE PHOSPHATASE: 61 U/L (ref 37–153)
ALT: 13 U/L (ref 6–29)
AST: 16 U/L (ref 10–35)
BILIRUBIN, TOTAL: 0.7 MG/DL (ref 0.2–1.2)
BUN: 21 MG/DL (ref 7–25)
CALCIUM: 9.7 MG/DL (ref 8.6–10.4)
CARBON DIOXIDE: 30 MMOL/L (ref 20–32)
CHLORIDE: 100 MMOL/L (ref 98–110)
CHOL/HDLC RATIO: 3.3 (CALC)
CHOLESTEROL, TOTAL: 235 MG/DL
CREATININE: 0.75 MG/DL (ref 0.5–1.03)
EGFR: 92 ML/MIN/1.73M2
GLOBULIN: 3 G/DL (CALC) (ref 1.9–3.7)
GLUCOSE: 91 MG/DL (ref 65–99)
HDL CHOLESTEROL: 71 MG/DL
HEMATOCRIT: 40.6 % (ref 35–45)
HEMOGLOBIN: 13.3 G/DL (ref 11.7–15.5)
LDL-CHOLESTEROL: 135 MG/DL (CALC)
MCH: 29.4 PG (ref 27–33)
MCHC: 32.8 G/DL (ref 32–36)
MCV: 89.6 FL (ref 80–100)
MPV: 11.2 FL (ref 7.5–12.5)
NON-HDL CHOLESTEROL: 164 MG/DL (CALC)
PLATELET COUNT: 138 THOUSAND/UL (ref 140–400)
POTASSIUM: 4.9 MMOL/L (ref 3.5–5.3)
PROTEIN, TOTAL: 7.4 G/DL (ref 6.1–8.1)
RDW: 13.1 % (ref 11–15)
RED BLOOD CELL COUNT: 4.53 MILLION/UL (ref 3.8–5.1)
SODIUM: 138 MMOL/L (ref 135–146)
TRIGLYCERIDES: 155 MG/DL
TSH W/REFLEX TO FT4: 2.87 MIU/L (ref 0.4–4.5)
WHITE BLOOD CELL COUNT: 4.9 THOUSAND/UL (ref 3.8–10.8)

## 2023-08-16 PROCEDURE — 3078F DIAST BP <80 MM HG: CPT | Performed by: FAMILY MEDICINE

## 2023-08-16 PROCEDURE — 3074F SYST BP LT 130 MM HG: CPT | Performed by: FAMILY MEDICINE

## 2023-08-16 PROCEDURE — 99396 PREV VISIT EST AGE 40-64: CPT | Performed by: FAMILY MEDICINE

## 2023-08-16 PROCEDURE — 3008F BODY MASS INDEX DOCD: CPT | Performed by: FAMILY MEDICINE

## 2023-10-02 ENCOUNTER — TELEPHONE (OUTPATIENT)
Dept: FAMILY MEDICINE CLINIC | Facility: CLINIC | Age: 58
End: 2023-10-02

## 2023-10-02 NOTE — TELEPHONE ENCOUNTER
Pt called to speak with a nurse because she has question about a few things that she doesn't want to disclose with psr.     Please advise     CB# [phone]

## 2023-10-02 NOTE — TELEPHONE ENCOUNTER
Marylin Mccann fell 9 days ago hitting the back of her head. She had no problems with head pain but the fall triggered her sciatica. She went to her chiropractor who took XRs of her neck and back. He noted a line on the XR which he said indicated calcification of her abdominal aorta. He suggested Marylin Mccann get a Calcium Score test. A test was scheduled. She is very concerned and wants  to be aware. She will have the results faxed here so Ac Jeffers can review the results and notify her.     Routed to Ac Jeffers

## 2023-10-03 NOTE — TELEPHONE ENCOUNTER
Patient can self schedule for heart scan/ coronary calcium test through [facility] website

## 2023-10-04 NOTE — TELEPHONE ENCOUNTER
Called patient she has already had this done at the [facility] imaging center we should be getting the results via fax and placed in Dr [place] in box.

## 2023-10-05 NOTE — TELEPHONE ENCOUNTER
Luzt called back and I told her Dr Jeff Arce said her calcium score was 0 so she is happy with this. no need to follow up.

## 2023-10-30 DIAGNOSIS — R05.9 COUGH: ICD-10-CM

## 2023-10-30 RX ORDER — ALBUTEROL SULFATE 90 UG/1
2 AEROSOL, METERED RESPIRATORY (INHALATION) EVERY 4 HOURS PRN
Qty: 1 EACH | Refills: 1 | Status: SHIPPED | OUTPATIENT
Start: 2023-10-30

## 2023-10-30 NOTE — TELEPHONE ENCOUNTER
LOV 8/16/23  Albuterol last prescribed 2016, no asthma on problem list.  Pt states was given Ventolin at a [facility] last week- DX with bronchitis. Pt c/o continued congestion and occ wheeze- Albuterol is helping. Will you authorize refill on Albuterol?   Advised Pt to schedule f/u

## 2023-10-30 NOTE — TELEPHONE ENCOUNTER
Pt is calling for a refill on her   Medication Quantity Refills Start End   Albuterol Sulfate HFA (PROAIR HFA) 108 (90 BASE) MCG/ACT Inhalation Aero Soln

## 2024-05-28 ENCOUNTER — APPOINTMENT (OUTPATIENT)
Dept: OBGYN | Age: 59
End: 2024-05-28

## 2024-06-04 DIAGNOSIS — Z12.31 VISIT FOR SCREENING MAMMOGRAM: Primary | ICD-10-CM

## 2024-06-25 ENCOUNTER — HOSPITAL ENCOUNTER (OUTPATIENT)
Dept: MAMMOGRAPHY | Age: 59
Discharge: HOME | End: 2024-06-25
Attending: OBSTETRICS & GYNECOLOGY

## 2024-06-25 ENCOUNTER — APPOINTMENT (OUTPATIENT)
Dept: OBGYN | Age: 59
End: 2024-06-25

## 2024-06-25 VITALS
SYSTOLIC BLOOD PRESSURE: 152 MMHG | BODY MASS INDEX: 28.68 KG/M2 | HEART RATE: 68 BPM | DIASTOLIC BLOOD PRESSURE: 81 MMHG | HEIGHT: 66 IN | WEIGHT: 178.46 LBS | OXYGEN SATURATION: 100 %

## 2024-06-25 DIAGNOSIS — Z12.31 VISIT FOR SCREENING MAMMOGRAM: ICD-10-CM

## 2024-06-25 DIAGNOSIS — Z01.419 WOMEN'S ANNUAL ROUTINE GYNECOLOGICAL EXAMINATION: Primary | ICD-10-CM

## 2024-06-25 PROCEDURE — 77067 SCR MAMMO BI INCL CAD: CPT

## 2024-06-25 PROCEDURE — 99396 PREV VISIT EST AGE 40-64: CPT | Performed by: OBSTETRICS & GYNECOLOGY

## 2024-06-25 ASSESSMENT — PATIENT HEALTH QUESTIONNAIRE - PHQ9
1. LITTLE INTEREST OR PLEASURE IN DOING THINGS: NOT AT ALL
2. FEELING DOWN, DEPRESSED OR HOPELESS: NOT AT ALL
CLINICAL INTERPRETATION OF PHQ2 SCORE: NO FURTHER SCREENING NEEDED
SUM OF ALL RESPONSES TO PHQ9 QUESTIONS 1 AND 2: 0
SUM OF ALL RESPONSES TO PHQ9 QUESTIONS 1 AND 2: 0

## 2024-07-04 LAB
CASE RPRT: NORMAL
CYTOLOGY CVX/VAG STUDY: NORMAL
HPV16+18+45 E6+E7MRNA CVX NAA+PROBE: NEGATIVE
Lab: NORMAL
PAP EDUCATIONAL NOTE: NORMAL
SPECIMEN ADEQUACY: NORMAL

## 2024-07-23 ENCOUNTER — TELEPHONE (OUTPATIENT)
Dept: FAMILY MEDICINE CLINIC | Facility: CLINIC | Age: 59
End: 2024-07-23

## 2024-07-23 DIAGNOSIS — Z13.0 SCREENING FOR IRON DEFICIENCY ANEMIA: ICD-10-CM

## 2024-07-23 DIAGNOSIS — Z13.6 SCREENING FOR CARDIOVASCULAR CONDITION: ICD-10-CM

## 2024-07-23 DIAGNOSIS — Z00.00 LABORATORY EXAMINATION ORDERED AS PART OF A ROUTINE GENERAL MEDICAL EXAMINATION: ICD-10-CM

## 2024-07-23 DIAGNOSIS — E55.9 VITAMIN D DEFICIENCY: ICD-10-CM

## 2024-07-23 DIAGNOSIS — E78.5 DYSLIPIDEMIA: ICD-10-CM

## 2024-07-23 DIAGNOSIS — Z12.31 VISIT FOR SCREENING MAMMOGRAM: ICD-10-CM

## 2024-07-23 DIAGNOSIS — Z13.29 SCREENING FOR THYROID DISORDER: ICD-10-CM

## 2024-07-23 DIAGNOSIS — Z13.1 SCREENING FOR DIABETES MELLITUS: Primary | ICD-10-CM

## 2024-07-23 NOTE — TELEPHONE ENCOUNTER
Please enter lab orders for the patient's upcoming physical appointment.     Physical scheduled:   Your appointments       Date & Time Appointment Department ([place])    Aug 21, 2024 10:00 AM CDT Physical - Established with Kendall Maria MD [facility] ([facility])              [facility]  [address]  [facility] [id number] [phone]           Preferred lab: [facility]     The patient has been notified to complete fasting labs prior to their physical appointment.

## 2024-07-26 NOTE — TELEPHONE ENCOUNTER
1. Screening for diabetes mellitus (Primary)  -     Comp Metabolic Panel (14)  2. Visit for screening mammogram  -     Mammogram Bilateral Screening with 3D; Future; Expected date: 07/25/2024  3. Screening for iron deficiency anemia  -     CBC With Differential With Platelet  4. Screening for thyroid disorder  -     TSH W Reflex To Free T4  5. Screening for cardiovascular condition  -     Lipid Panel  6. Laboratory examination ordered as part of a routine general medical examination  -     TSH W Reflex To Free T4  -     Lipid Panel  -     CBC With Differential With Platelet  -     Comp Metabolic Panel (14)  -     Vitamin D, 25-Hydroxy  7. Dyslipidemia  -     TSH W Reflex To Free T4  -     Lipid Panel  8. Vitamin D deficiency  -     Vitamin D, 25-Hydroxy       OK to notify. Thanks, Gallo Maria MD

## 2024-09-10 ENCOUNTER — TELEPHONE (OUTPATIENT)
Dept: OBGYN | Age: 59
End: 2024-09-10

## 2024-09-17 NOTE — ASSESSMENT & PLAN NOTE
Cholesterol shows Good control. Long term heart-healthy diet and lifestyle discussed and encouraged to reduce risk of cardiovascular disease.  8/15/2023: CHOLESTEROL, TOTAL 235 (H); HDL CHOLESTEROL 71; TRIGLYCERIDES 155 (H); LDL-CHOLESTEROL 135 (H)  No current Cholesterol medication. stable  Continue with current treatment plan

## 2024-09-18 ENCOUNTER — OFFICE VISIT (OUTPATIENT)
Dept: FAMILY MEDICINE CLINIC | Facility: CLINIC | Age: 59
End: 2024-09-18
Payer: COMMERCIAL

## 2024-09-18 VITALS
RESPIRATION RATE: 14 BRPM | HEART RATE: 72 BPM | DIASTOLIC BLOOD PRESSURE: 80 MMHG | SYSTOLIC BLOOD PRESSURE: 138 MMHG | BODY MASS INDEX: 28.42 KG/M2 | HEIGHT: 66 IN | WEIGHT: 176.81 LBS

## 2024-09-18 DIAGNOSIS — Z00.00 ANNUAL PHYSICAL EXAM: Primary | ICD-10-CM

## 2024-09-18 DIAGNOSIS — E78.2 MIXED HYPERLIPIDEMIA: ICD-10-CM

## 2024-09-18 LAB
ABSOLUTE BASOPHILS: 41 CELLS/UL (ref 0–200)
ABSOLUTE EOSINOPHILS: 221 CELLS/UL (ref 15–500)
ABSOLUTE LYMPHOCYTES: 1380 CELLS/UL (ref 850–3900)
ABSOLUTE MONOCYTES: 396 CELLS/UL (ref 200–950)
ABSOLUTE NEUTROPHILS: 2562 CELLS/UL (ref 1500–7800)
ALBUMIN/GLOBULIN RATIO: 1.6 (CALC) (ref 1–2.5)
ALBUMIN: 4.5 G/DL (ref 3.6–5.1)
ALKALINE PHOSPHATASE: 56 U/L (ref 37–153)
ALT: 15 U/L (ref 6–29)
AST: 18 U/L (ref 10–35)
BASOPHILS: 0.9 %
BILIRUBIN, TOTAL: 0.7 MG/DL (ref 0.2–1.2)
BUN: 23 MG/DL (ref 7–25)
CALCIUM: 9.9 MG/DL (ref 8.6–10.4)
CARBON DIOXIDE: 29 MMOL/L (ref 20–32)
CHLORIDE: 102 MMOL/L (ref 98–110)
CHOL/HDLC RATIO: 2.9 (CALC)
CHOLESTEROL, TOTAL: 220 MG/DL
CREATININE: 0.66 MG/DL (ref 0.5–1.03)
EGFR: 101 ML/MIN/1.73M2
EOSINOPHILS: 4.8 %
GLOBULIN: 2.9 G/DL (CALC) (ref 1.9–3.7)
GLUCOSE: 100 MG/DL (ref 65–99)
HDL CHOLESTEROL: 77 MG/DL
HEMATOCRIT: 41.3 % (ref 35–45)
HEMOGLOBIN: 13.1 G/DL (ref 11.7–15.5)
LDL-CHOLESTEROL: 122 MG/DL (CALC)
LYMPHOCYTES: 30 %
MCH: 29.6 PG (ref 27–33)
MCHC: 31.7 G/DL (ref 32–36)
MCV: 93.2 FL (ref 80–100)
MONOCYTES: 8.6 %
MPV: 11.3 FL (ref 7.5–12.5)
NEUTROPHILS: 55.7 %
NON-HDL CHOLESTEROL: 143 MG/DL (CALC)
PLATELET COUNT: 205 THOUSAND/UL (ref 140–400)
POTASSIUM: 4.9 MMOL/L (ref 3.5–5.3)
PROTEIN, TOTAL: 7.4 G/DL (ref 6.1–8.1)
RDW: 13.2 % (ref 11–15)
RED BLOOD CELL COUNT: 4.43 MILLION/UL (ref 3.8–5.1)
SODIUM: 140 MMOL/L (ref 135–146)
TRIGLYCERIDES: 107 MG/DL
TSH W/REFLEX TO FT4: 3.44 MIU/L (ref 0.4–4.5)
VITAMIN D, 25-OH, TOTAL: 22 NG/ML (ref 30–100)
WHITE BLOOD CELL COUNT: 4.6 THOUSAND/UL (ref 3.8–10.8)

## 2024-09-18 PROCEDURE — 3075F SYST BP GE 130 - 139MM HG: CPT | Performed by: FAMILY MEDICINE

## 2024-09-18 PROCEDURE — 99396 PREV VISIT EST AGE 40-64: CPT | Performed by: FAMILY MEDICINE

## 2024-09-18 PROCEDURE — 3008F BODY MASS INDEX DOCD: CPT | Performed by: FAMILY MEDICINE

## 2024-09-18 PROCEDURE — 3079F DIAST BP 80-89 MM HG: CPT | Performed by: FAMILY MEDICINE

## 2024-09-18 RX ORDER — OMEGA-3 FATTY ACIDS/FISH OIL 300-1000MG
CAPSULE ORAL
COMMUNITY

## 2024-09-18 NOTE — PROGRESS NOTES
Nae Ureña is a 59 year old female who presents for a complete physical exam.     had concerns including Physical (WWE, no pap, see's gyne /).   Her last annual assessment has been over 1 year: Annual Physical due on 2024       Subjective:     Symptoms: is menopausal. Patient complains of dogin wlel. .   Tobacco:  She has never smoked tobacco.       Wt Readings from Last 4 Encounters:   24 176 lb 12.8 oz (80.2 kg)   23 176 lb (79.8 kg)   22 175 lb (79.4 kg)   08/10/22 183 lb 6.4 oz (83.2 kg)     Body mass index is 28.54 kg/m².     The 10-year ASCVD risk score (Tammy MERCADO, et al., 2019) is: 2.9%    Values used to calculate the score:      Age: 59 years      Sex: Female      Is Non- : No      Diabetic: No      Tobacco smoker: No      Systolic Blood Pressure: 138 mmHg      Is BP treated: No      HDL Cholesterol: 77 mg/dL      Total Cholesterol: 220 mg/dL    Chief Complaint Reviewed and Verified  Nursing Notes Reviewed and   Verified  Tobacco Reviewed  Allergies Reviewed  Medications Reviewed    Problem List Reviewed  Medical History Reviewed  Surgical History   Reviewed  OB Status Reviewed  Family History Reviewed          Her family history includes Breast Cancer in her maternal grandmother; Dementia in her father; Hypertension in her father; Lipids in her father; Neurological Disorder (age of onset: 0) in her daughter.   She  reports that she has never smoked. She has never used smokeless tobacco. She reports current alcohol use of about 3.0 standard drinks of alcohol per week. She reports that she does not use drugs.    Exercise: three times per week.  Diet: watches fats closely and watches sugar closely    GYNE HISTORY: Menstrual History:  OB History    Para Term  AB Living   4 1 1 0 3 1   SAB IAB Ectopic Multiple Live Births   1 2 0 0 1      Menarche age:    No LMP recorded. (Menstrual status: Perimenopausal).       Health Maintenance  Due   Topic Date Due    DTaP,Tdap,and Td Vaccines (1 - Tdap) Never done    Zoster Vaccines (1 of 2) Never done    Annual Depression Screening  01/01/2024    Mammogram  06/20/2024    Annual Physical  08/16/2024    COVID-19 Vaccine (1 - 2023-24 season) Never done         Review of Systems   Constitutional: Negative.  Negative for fatigue, fever and unexpected weight change.   HENT: Negative.     Eyes: Negative.    Respiratory: Negative.     Cardiovascular: Negative.    Gastrointestinal: Negative.  Negative for nausea and vomiting.   Endocrine: Negative.  Negative for polydipsia, polyphagia and polyuria.   Genitourinary: Negative.    Musculoskeletal: Negative.  Negative for neck pain.   Skin: Negative.    Neurological: Negative.    Psychiatric/Behavioral: Negative.     All other systems reviewed and are negative.       Results:    Lab Results   Component Value Date/Time    WBC 4.6 09/17/2024 08:53 AM    HGB 13.1 09/17/2024 08:53 AM     09/17/2024 08:53 AM      Lab Results   Component Value Date/Time     (H) 09/17/2024 08:53 AM     09/17/2024 08:53 AM    K 4.9 09/17/2024 08:53 AM     09/17/2024 08:53 AM    CO2 29 09/17/2024 08:53 AM    CREATSERUM 0.66 09/17/2024 08:53 AM    CA 9.9 09/17/2024 08:53 AM    ALB 4.5 09/17/2024 08:53 AM    TP 7.4 09/17/2024 08:53 AM    ALKPHO 56 09/17/2024 08:53 AM    AST 18 09/17/2024 08:53 AM    ALT 15 09/17/2024 08:53 AM    BILT 0.7 09/17/2024 08:53 AM        Lab Results   Component Value Date/Time    CHOLEST 220 (H) 09/17/2024 08:53 AM    HDL 77 09/17/2024 08:53 AM    TRIG 107 09/17/2024 08:53 AM     (H) 09/17/2024 08:53 AM    NONHDLC 143 (H) 09/17/2024 08:53 AM       Last A1c valu e was  % done  .     Vitamin D:     Lab Results   Component Value Date    VITD 22 (L) 09/17/2024          Objective:    EXAM:  /80   Pulse 72   Resp 14   Ht 5' 6\" (1.676 m)   Wt 176 lb 12.8 oz (80.2 kg)   BMI 28.54 kg/m²  Estimated body mass index is 28.54 kg/m² as  calculated from the following:    Height as of this encounter: 5' 6\" (1.676 m).    Weight as of this encounter: 176 lb 12.8 oz (80.2 kg).   Physical Exam  Vitals and nursing note reviewed.   Constitutional:       General: She is not in acute distress.     Appearance: Normal appearance. She is well-developed.   HENT:      Head: Normocephalic and atraumatic.      Right Ear: Tympanic membrane and external ear normal.      Left Ear: Tympanic membrane and external ear normal.      Nose: Nose normal.      Mouth/Throat:      Mouth: Mucous membranes are moist.   Eyes:      Extraocular Movements: Extraocular movements intact.      Pupils: Pupils are equal, round, and reactive to light.   Cardiovascular:      Rate and Rhythm: Normal rate and regular rhythm.      Pulses: Normal pulses.           Carotid pulses are 2+ on the right side and 2+ on the left side.       Radial pulses are 2+ on the right side and 2+ on the left side.        Dorsalis pedis pulses are 2+ on the right side and 2+ on the left side.        Posterior tibial pulses are 2+ on the right side and 2+ on the left side.      Heart sounds: Normal heart sounds, S1 normal and S2 normal. No murmur heard.  Pulmonary:      Effort: Pulmonary effort is normal. No respiratory distress.      Breath sounds: Normal breath sounds.   Abdominal:      General: Abdomen is flat. Bowel sounds are normal. There is no distension.      Palpations: Abdomen is soft.   Musculoskeletal:         General: Normal range of motion.      Cervical back: Normal range of motion and neck supple.      Right lower leg: No edema.      Left lower leg: No edema.   Skin:     General: Skin is warm and dry.      Capillary Refill: Capillary refill takes less than 2 seconds.      Findings: No rash.   Neurological:      General: No focal deficit present.      Mental Status: She is alert and oriented to person, place, and time.   Psychiatric:         Mood and Affect: Mood normal.         Behavior: Behavior  normal.         Thought Content: Thought content normal.         Judgment: Judgment normal.          Assessment & Plan:    Nae Ureña is a 59 year old female who presents for a complete physical exam.   Pt's weight is Body mass index is 28.54 kg/m²., recommended low fat diet and aerobic exercise 30 minutes three times weekly.   Health maintenance, Up to date    Immunizations: Up to date     There is no immunization history on file for this patient.      Pt info given for: exercise, low fat diet, The patient indicates understanding of these issues and agrees to the plan.  The patient is asked to return for CPX in 1 years.    Assessment:  1. Annual physical exam (Primary)  2. Mixed hyperlipidemia  Overview:  Stable, Continue present management.      ASCVD 1.2% 10 year risk, 39% lifetime, TLC recommended  Assessment & Plan:  Cholesterol shows Good control. Long term heart-healthy diet and lifestyle discussed and encouraged to reduce risk of cardiovascular disease.  8/15/2023: CHOLESTEROL, TOTAL 235 (H); HDL CHOLESTEROL 71; TRIGLYCERIDES 155 (H); LDL-CHOLESTEROL 135 (H)  No current Cholesterol medication. stable  Continue with current treatment plan      Doing very well overall, work on stretching as hamstrings are tight.  We discussed weight loss medications and weight gain prevention strategies and she can look into this in the future.    I am having Nae Ureña maintain her Coenzyme Q10 (COQ10 OR), vitamin C, albuterol, and Omega 3.     Return in about 1 year (around 9/18/2025) for Annual physical.

## 2024-11-18 ENCOUNTER — OFFICE VISIT (OUTPATIENT)
Dept: FAMILY MEDICINE CLINIC | Facility: CLINIC | Age: 59
End: 2024-11-18
Payer: COMMERCIAL

## 2024-11-18 VITALS
RESPIRATION RATE: 16 BRPM | SYSTOLIC BLOOD PRESSURE: 124 MMHG | DIASTOLIC BLOOD PRESSURE: 80 MMHG | HEART RATE: 72 BPM | OXYGEN SATURATION: 98 %

## 2024-11-18 DIAGNOSIS — M25.512 ACUTE PAIN OF LEFT SHOULDER: Primary | ICD-10-CM

## 2024-11-18 NOTE — PROGRESS NOTES
Chief Complaint   Patient presents with    Shoulder Pain              HPI:  Presents with approx 1 month history of left shoulder pain that started after she stumbled on her stairs and caught herself by bracing arms out on both walls, extending her arms and shoulders behind her body. Reports pain is worse with lifting arm to level of shoulder and also with some rotational movements of left arm. Denies redness, swelling, weakness of shoulder. Has been treating with ibuprofen with some relief.     History reviewed. No pertinent past medical history.    Patient Active Problem List   Diagnosis    Mixed hyperlipidemia       Current Outpatient Medications   Medication Sig Dispense Refill    Omega 3 1000 MG Oral Cap Take by mouth.      albuterol (PROAIR HFA) 108 (90 Base) MCG/ACT Inhalation Aero Soln Inhale 2 puffs into the lungs every 4 (four) hours as needed for Wheezing. 1 each 1    Ascorbic Acid (VITAMIN C) 1000 MG Oral Tab Take 1 tablet (1,000 mg total) by mouth daily.      Coenzyme Q10 (COQ10 OR) Take by mouth.         Physical Exam  /80   Pulse 72   Resp 16   SpO2 98%   Constitutional: well developed, well nourished, in no apparent distress  HEENT: Normocephalic and atraumatic.  Cardiovascular: Normal rate.   Pulmonary/Chest: No respiratory distress. Effort normal.  MS: Left shoulder w/o edema, erythema, masses, deformity or TTP. Muscle strength bilat shoulders 5/5. Positive internal rotation test. Pain reproduced with lateral and anterior elevation of left arm to level of shoulder. Cross body reach normal.   Skin: Skin is warm and dry. No rash noted. No erythema. No pallor.     A/P:    Encounter Diagnosis   Name Primary?    Acute pain of left shoulder- discussed no need for prescription antiinflammatories but may take OTC ibuprofen as needed. Start some home stretches, demonstrated in office. Referred to PT for further treatment. If external location does not take insurance, notify office and I will change  to [place] location. Verbalized understanding of instructions and agreeable to this plan of care.    Yes     Total visit time was 24 minutes, including 19 minutes of face to face visit time and 5 minutes of documentation and chart review.     No orders of the defined types were placed in this encounter.      Meds & Refills for this Visit:  Requested Prescriptions      No prescriptions requested or ordered in this encounter       Imaging & Consults:  PHYSICAL THERAPY EXTERNAL    No follow-ups on file.  There are no Patient Instructions on file for this visit.    All questions were answered and the patient understands the plan.

## 2025-01-02 ENCOUNTER — OFFICE VISIT (OUTPATIENT)
Dept: FAMILY MEDICINE CLINIC | Facility: CLINIC | Age: 60
End: 2025-01-02
Payer: COMMERCIAL

## 2025-01-02 VITALS
TEMPERATURE: 97 F | HEART RATE: 67 BPM | RESPIRATION RATE: 18 BRPM | SYSTOLIC BLOOD PRESSURE: 157 MMHG | OXYGEN SATURATION: 99 % | DIASTOLIC BLOOD PRESSURE: 86 MMHG

## 2025-01-02 DIAGNOSIS — R06.2 WHEEZING: Primary | ICD-10-CM

## 2025-01-02 PROCEDURE — 99213 OFFICE O/P EST LOW 20 MIN: CPT | Performed by: NURSE PRACTITIONER

## 2025-01-02 PROCEDURE — 3077F SYST BP >= 140 MM HG: CPT | Performed by: NURSE PRACTITIONER

## 2025-01-02 PROCEDURE — 3079F DIAST BP 80-89 MM HG: CPT | Performed by: NURSE PRACTITIONER

## 2025-01-02 RX ORDER — ALBUTEROL SULFATE 90 UG/1
1-2 INHALANT RESPIRATORY (INHALATION) EVERY 4 HOURS PRN
Qty: 1 EACH | Refills: 0 | Status: SHIPPED | OUTPATIENT
Start: 2025-01-02

## 2025-01-02 NOTE — PROGRESS NOTES
CHIEF COMPLAINT:   wheeze      HPI:   Nae Ureña is a 59 year old female who presents for wheezing x 1 week. Reports this happens sometimes. Wheezing worse in the morning. Used daughters albuterol inhaler which helped. Patient reports morning wheezing. Denies sob, body ache, fever. Symptoms have been same since onset.  Treating symptoms with albuterol, needs refill.      Current Outpatient Medications   Medication Sig Dispense Refill    albuterol (PROAIR HFA) 108 (90 Base) MCG/ACT Inhalation Aero Soln Inhale 1-2 puffs into the lungs every 4 (four) hours as needed for Wheezing or Shortness of Breath. 1 each 0    Omega 3 1000 MG Oral Cap Take by mouth.      albuterol (PROAIR HFA) 108 (90 Base) MCG/ACT Inhalation Aero Soln Inhale 2 puffs into the lungs every 4 (four) hours as needed for Wheezing. 1 each 1    Ascorbic Acid (VITAMIN C) 1000 MG Oral Tab Take 1 tablet (1,000 mg total) by mouth daily.      Coenzyme Q10 (COQ10 OR) Take by mouth.        No past medical history on file.   No past surgical history on file.      Social History     Socioeconomic History    Marital status:    Occupational History    Occupation: [employer]     Employer: [employer]     Comment: [employer]   Tobacco Use    Smoking status: Never    Smokeless tobacco: Never   Vaping Use    Vaping status: Never Used   Substance and Sexual Activity    Alcohol use: Yes     Alcohol/week: 3.0 standard drinks of alcohol     Types: 3 Standard drinks or equivalent per week     Comment: occassionally    Drug use: No   Other Topics Concern    Caffeine Concern Yes     Comment: 1 cups coffee daily    Exercise Yes     Comment: 3-4x a week     Seat Belt Yes     Social Drivers of Health     Physical Activity: Sufficiently Active (10/22/2020)    Received from [employer], [facility]    Exercise Vital Sign     Days of Exercise per Week: 4 days     Minutes of Exercise per Session: 60 min    [facility] from [facility], [facility]    Housing Stability         REVIEW OF SYSTEMS:   GENERAL: feels well otherwise, good appetite  SKIN: no rashes or abnormal skin lesions  HEENT: See HPI  LUNGS: denies shortness of breath or wheezing, See HPI  CARDIOVASCULAR: denies chest pain or palpitations   GI: denies N/V/C or abdominal pain  NEURO: Denies headaches    EXAM:   /86   Pulse 67   Temp 97.1 °F (36.2 °C)   Resp 18   SpO2 99%   GENERAL: well developed, well nourished, in no apparent distress  SKIN: no rashes, no suspicious lesions  HEENT: atraumatic, normocephalic. conjunctiva clear. Oral mucosa pink, moist.  THROAT:NECK: Supple, non-tender  LUNGS: expiratory wheeze  CARDIO: RRR without murmur  EXTREMITIES: no cyanosis, clubbing or edema  LYMP: bilateral anterior cervical lymphadenopathy.    ASSESSMENT AND PLAN:   Nae Ureña is a 59 year old female who presents with     Diagnoses and all orders for this visit:    Wheezing  -     albuterol (PROAIR HFA) 108 (90 Base) MCG/ACT Inhalation Aero Soln; Inhale 1-2 puffs into the lungs every 4 (four) hours as needed for Wheezing or Shortness of Breath.       Risks, benefits, and side effects of medication explained and discussed.    Discussed physical exam and hpi with pt. No bacterial focus noted on exam. Pt has reassuring physical exam consistent with wheezing, possible reactive airway. Lungs with slight wheeze bilateral. No respiratory distress noted. Treatment options discussed with patient and explained in detail. We reviewed symptomatic care at home. The risks, benefits and potential side effects of possible medications were reviewed. Alternatives were discussed. Monitoring parameters and expected course outlined. Patient to call PCP or go to emergency department if symptoms fail to respond as outlined, or worsen in any way. The patient agreed with the plan.  See Patient Handout    The patient indicates understanding of these  issues and agrees to the plan.  The patient is asked to follow up with PCP if sx's persist or worsen.

## 2025-02-03 ENCOUNTER — OFFICE VISIT (OUTPATIENT)
Dept: FAMILY MEDICINE CLINIC | Facility: CLINIC | Age: 60
End: 2025-02-03
Payer: COMMERCIAL

## 2025-02-03 ENCOUNTER — TELEPHONE (OUTPATIENT)
Dept: FAMILY MEDICINE CLINIC | Facility: CLINIC | Age: 60
End: 2025-02-03

## 2025-02-03 ENCOUNTER — TELEPHONE (OUTPATIENT)
Dept: PHYSICAL THERAPY | Facility: HOSPITAL | Age: 60
End: 2025-02-03

## 2025-02-03 VITALS
OXYGEN SATURATION: 98 % | TEMPERATURE: 97 F | RESPIRATION RATE: 16 BRPM | BODY MASS INDEX: 29.66 KG/M2 | WEIGHT: 178 LBS | HEIGHT: 65 IN | DIASTOLIC BLOOD PRESSURE: 83 MMHG | SYSTOLIC BLOOD PRESSURE: 151 MMHG | HEART RATE: 68 BPM

## 2025-02-03 DIAGNOSIS — N30.00 ACUTE CYSTITIS WITHOUT HEMATURIA: Primary | ICD-10-CM

## 2025-02-03 DIAGNOSIS — M25.512 ACUTE PAIN OF LEFT SHOULDER: Primary | ICD-10-CM

## 2025-02-03 DIAGNOSIS — R39.9 URINARY SYMPTOM OR SIGN: ICD-10-CM

## 2025-02-03 LAB
BILIRUBIN: NEGATIVE
GLUCOSE (URINE DIPSTICK): NEGATIVE MG/DL
KETONES (URINE DIPSTICK): NEGATIVE MG/DL
MULTISTIX LOT#: ABNORMAL NUMERIC
NITRITE, URINE: NEGATIVE
OCCULT BLOOD: NEGATIVE
PH, URINE: 7.5 (ref 4.5–8)
SPECIFIC GRAVITY: 1.02 (ref 1–1.03)
URINE-COLOR: YELLOW
UROBILINOGEN,SEMI-QN: 0.2 MG/DL (ref 0–1.9)

## 2025-02-03 PROCEDURE — 87086 URINE CULTURE/COLONY COUNT: CPT | Performed by: PHYSICIAN ASSISTANT

## 2025-02-03 PROCEDURE — 87077 CULTURE AEROBIC IDENTIFY: CPT | Performed by: PHYSICIAN ASSISTANT

## 2025-02-03 PROCEDURE — 87186 SC STD MICRODIL/AGAR DIL: CPT | Performed by: PHYSICIAN ASSISTANT

## 2025-02-03 RX ORDER — AMOXICILLIN 875 MG/1
875 TABLET, COATED ORAL 2 TIMES DAILY
Qty: 20 TABLET | Refills: 0 | Status: SHIPPED | OUTPATIENT
Start: 2025-02-03 | End: 2025-02-13

## 2025-02-03 NOTE — TELEPHONE ENCOUNTER
Spoke w/pt. She would now like internal since her insurance now covers it. Internal PT order placed.

## 2025-02-03 NOTE — PROGRESS NOTES
CHIEF COMPLAINT:     Chief Complaint   Patient presents with    UTI     Frequency, urgency, and cloudy urine x 2 days, no otc        HPI:   Nae Ureña is a 59 year old female who presents with symptoms of UTI x 2 days .(+) urgency, frequency, cloudy urine.   Denies fever, no dysuria, no abd pain, no hematuria, no flank pain.   No OTC meds for ongoing symptoms.     H/o bronchitis x 1 month ago, overall LRI sx have improved yet continues to have wheezing more notable when lying down.  Exercises regularly and active at work, no impact in activity.   Albuterol mdi prn without resolution.   Denies h/o tobacco use or vaping.   NO fever/chills/sweats, no CP, no SOB/MAYO, no hemoptysis.     Current Outpatient Medications   Medication Sig Dispense Refill    amoxicillin 875 MG Oral Tab Take 1 tablet (875 mg total) by mouth 2 (two) times daily for 10 days. 20 tablet 0    Omega 3 1000 MG Oral Cap Take by mouth.      albuterol (PROAIR HFA) 108 (90 Base) MCG/ACT Inhalation Aero Soln Inhale 2 puffs into the lungs every 4 (four) hours as needed for Wheezing. 1 each 1    Ascorbic Acid (VITAMIN C) 1000 MG Oral Tab Take 1 tablet (1,000 mg total) by mouth daily.      albuterol (PROAIR HFA) 108 (90 Base) MCG/ACT Inhalation Aero Soln Inhale 1-2 puffs into the lungs every 4 (four) hours as needed for Wheezing or Shortness of Breath. 1 each 0    Coenzyme Q10 (COQ10 OR) Take by mouth. (Patient not taking: Reported on 2/3/2025)        No past medical history on file.   Social History:  Social History     Socioeconomic History    Marital status:    Occupational History    Occupation:      Employer: [employer]     Comment: [employer]   Tobacco Use    Smoking status: Never    Smokeless tobacco: Never   Vaping Use    Vaping status: Never Used   Substance and Sexual Activity    Alcohol use: Yes     Alcohol/week: 3.0 standard drinks of alcohol     Types: 3 Standard drinks or equivalent per week      Comment: occassionally    Drug use: No   Other Topics Concern    Caffeine Concern Yes     Comment: 1 cups coffee daily    Exercise Yes     Comment: 3-4x a week     Seat Belt Yes     Social Drivers of Health     Physical Activity: Sufficiently Active (10/22/2020)    Received from "[facility], [facility]    Exercise Vital Sign     Days of Exercise per Week: 4 days     Minutes of Exercise per Session: 60 min    [facility]    Housing Stability         REVIEW OF SYSTEMS:   GENERAL: See above  GI: See HPI.    : See HPI.      EXAM:   /83   Pulse 68   Temp 97.1 °F (36.2 °C)   Resp 16   Ht 5' 5\" (1.651 m)   Wt 178 lb (80.7 kg)   SpO2 98%   BMI 29.62 kg/m²   GENERAL: well developed, well nourished,in no apparent distress  CARDIO: RRR, no murmurs  LUNGS: Wheeze R base, remainder lung fields CTA, normal effort, no retractions or accessory muscle use.   GI: ABD (+)BS, soft, ntnd, no masses, no g/r/r no organomegaly, no CVAT.   MS  INGRAM, no c/c/e.     Recent Results (from the past 24 hours)   URINALYSIS, AUTO, W/O SCOPE    Collection Time: 02/03/25  1:42 PM   Result Value Ref Range    Glucose Urine Negative Negative mg/dL    Bilirubin Urine Negative Negative    Ketones, UA Negative Negative - Trace mg/dL    Spec Gravity 1.020 1.005 - 1.030    Blood Urine Negative Negative    PH Urine 7.5 5.0 - 8.0    Protein Urine Trace Negative - Trace mg/dL    Urobilinogen Urine 0.2 0.2 - 1.0 mg/dL    Nitrite Urine Negative Negative    Leukocyte Esterase Urine Small (A) Negative    APPEARANCE cloudy Clear    Color Urine yellow Yellow    Multistix Lot# [id number] Numeric    Multistix Expiration Date 9/30/25 Date         ASSESSMENT AND PLAN:   Nae Ureña is a 59 year old female presents with urinary symptoms.    ASSESSMENT:  Encounter Diagnoses   Name Primary?    Urinary symptom or sign     Acute cystitis without hematuria Yes       PLAN:     Urine dip suggestive of UTI, amoxicillin per epic.  Previous cx form 2021 reviewed, grew out proteus sensitive to PCNs.   Discussed persistent wheeze, recommend f/u with PCP for further evaluation/management beyond scope of [facility].  Pt is otherwise asymptomatic.     Meds as listed below.  Comfort measures as described in Patient Instructions. Urine cx pending.     Meds & Refills for this Visit:  Requested Prescriptions     Signed Prescriptions Disp Refills    amoxicillin 875 MG Oral Tab 20 tablet 0     Sig: Take 1 tablet (875 mg total) by mouth 2 (two) times daily for 10 days.       Risk and benefits of medication discussed.   Stressed importance of completing full course of antibiotic unless told otherwise.     The patient indicates understanding of these issues and agrees to the plan.  The patient is asked to see PCP in 3 days if not better. Seek care immediately for new onset of fever, vomiting, worsening symptoms.    Patient Instructions    Amoxicillin twice daily for 10 days.    Urine culture pending, results expected in 24-72 hours.    Follow up with your primary care provider regarding persistent wheezing in chest.     Follow up with your primary care provider if your symptoms fail to improve and resolve as anticipated    Go to the Immediate Care or Emergency Department in event of new or worsening symptoms at any time       Lanette Lozano PA-C

## 2025-02-03 NOTE — PATIENT INSTRUCTIONS
Amoxicillin twice daily for 10 days.    Urine culture pending, results expected in 24-72 hours.    Follow up with your primary care provider regarding persistent wheezing in chest.     Follow up with your primary care provider if your symptoms fail to improve and resolve as anticipated    Go to the Immediate Care or Emergency Department in event of new or worsening symptoms at any time

## 2025-02-05 ENCOUNTER — TELEPHONE (OUTPATIENT)
Dept: FAMILY MEDICINE CLINIC | Facility: CLINIC | Age: 60
End: 2025-02-05

## 2025-02-05 NOTE — TELEPHONE ENCOUNTER
Called pt and discussed results of urine culture and new treatment. Answered all questions and addressed concerns. Pt voiced understanding.

## 2025-02-17 ENCOUNTER — TELEPHONE (OUTPATIENT)
Dept: PHYSICAL THERAPY | Facility: HOSPITAL | Age: 60
End: 2025-02-17

## 2025-02-18 ENCOUNTER — OFFICE VISIT (OUTPATIENT)
Dept: PHYSICAL THERAPY | Age: 60
End: 2025-02-18
Attending: FAMILY MEDICINE
Payer: COMMERCIAL

## 2025-02-18 DIAGNOSIS — M25.512 ACUTE PAIN OF LEFT SHOULDER: Primary | ICD-10-CM

## 2025-02-18 PROCEDURE — 97110 THERAPEUTIC EXERCISES: CPT

## 2025-02-18 PROCEDURE — 97161 PT EVAL LOW COMPLEX 20 MIN: CPT

## 2025-02-18 NOTE — PROGRESS NOTES
SHOULDER EVALUATION:     Diagnosis:   Acute pain of left shoulder (M25.512)       Referring Provider: Kendall Maria  Date of Evaluation:    2/18/2025    Precautions:  None Next MD visit:   none scheduled  Date of Surgery: n/a     PATIENT SUMMARY   Nae Ureña is a 59 year old female who presents to therapy today with complaints of L sided arm pain which started 4 months ago. Pt states that her injury occurred when she was walking down the stairs and ended up tripping forward resulting in a slight hyper-extension of her arms bilaterally(in direction of horizontal abduction). Pt denies numbness and tingling but reports a \"burning\" sensation in the area of her triceps with palpation.    Pt describes pain level current 2/10, at best 0/10, at worst 7/10.   Current functional limitations include pain with shoulder extension, horizontal abduction and reaching behind the back.     Nae describes prior level of function independent w/o difficulty. Pt goals include improve LUE pain and return to PLOF symptom free.  Past medical history was reviewed with Nae. Significant findings include  has no past medical history on file.     ASSESSMENT  Nae presents to physical therapy evaluation with primary c/o L sided arm pain. The results of the objective tests and measures show mild forward head rounded shoulders posture, tightness and trps throughout pec, and triceps musculature, and weakness of scapular retractor and rotator cuff musculature bilaterally .  Functional deficits include but are not limited to pain with shoulder extension, horizontal abduction and reaching behind her back using LUE.  Signs and symptoms are consistent with diagnosis of Acute pain of left shoulder and possible pec major strain. Pt and PT discussed evaluation findings, pathology, POC and HEP.  Pt voiced understanding and performs HEP correctly without reported pain. Skilled Physical Therapy is medically necessary to address the above impairments  and reach functional goals.     OBJECTIVE:   Observation/Posture: mild forward head rounded shoulders posture  Palpation: tenderness of upper trap, and pec musculature on the L  Sensation: intact and equal bilaterally  Cervical Screen: WNL    AROM: (* denotes performed with pain)  Shoulder    Flexion: R WNL; L WNL  Abduction: R WNL; L WNL  ER: *R 70; L 85  IR: R WNL; L WNL     Accessory motion: T-spine hypomobility in extension and rotation bilaterally    Flexibility: pec and latissimus tightness bilaterally    Strength/MMT: (* denotes performed with pain)  Shoulder Elbow Scapular   Flexion: R 4/5; L 5/5  Abduction: R 4/5; L 5/5  ER: R 4/5; L 5/5  IR: R 5-/5; L 5/5 Flexion: R 5/5; L 5/5  Extension: R 5/5; L 5/5  Supination: R 5/5; L 5/5  Pronation: R 5/5; L 5/5  Rhomboids: R 4/5, L 4/5  Mid trap: R 4/5; L 4/5   Lats: R 4/5, L 4/5  Low trap: R 4/5; L 4/5     Special tests:   Subacromial Pain Syndrome:  Empty Can test: R -, L -  Dow-Watson Impingement Sign: R -, L -  Neer Impingement Test: R +, L -  Palpable Tenderness Infraspinatus and Supraspinatus: R -, L -    Long Head Biceps Tendinopathy:   Speed Test: R -, L -  Yergason's Test: R -, L -    Today’s Treatment and Response:   Pt education was provided on exam findings, treatment diagnosis, treatment plan, expectations, and prognosis. Pt was also provided recommendations for activity modifications, possible soreness after evaluation, modalities as needed [ice/heat], and importance of remaining active.  Patient was instructed in and issued a HEP for: Access Code: [email or web address]  URL: [email or web address]  Date: 02/18/2025  Prepared by: Ezio Vargas    Exercises  - Corner Pec Major Stretch  - 1 x daily - 5-6 x weekly - 3 sets - 10 reps  - Thoracic Foam Roll Mobilization Backstroke  - 1 x daily - 5-6 x weekly - 3 sets - 10 reps  - Seated Scapular Retraction  - 1 x daily - 5-6 x weekly - 3 sets - 10 reps  - Isometric Shoulder Flexion at Wall  - 5-6 x  weekly - 3 sets - 10 reps - 5-10 hold  - Supine Shoulder Horizontal Abduction with Resistance  - 1 x daily - 3-4 x weekly - 3 sets - 10 reps  - Hooklying Scapular Protraction on Foam Roll  - 1 x daily - 3-4 x weekly - 3 sets - 10 reps    Charges: PT Errol Low Complexity, 23      Total Timed Treatment: 22 min     Total Treatment Time: 45 min       PLAN OF CARE:    Goals: (to be met in 8 visits)    Not Met Progress Toward Partially Met Met   Pt will report improved ability to sleep without waking due to shoulder pain. [] [] [] []   Pt will improve shoulder horizontal abduction AROM on the L to the same as the R to improve ability to reach behind back,don/doff shirt and wash hair. [] [] [] []   Pt will increase shoulder AROM ER to 85 to be able to reach and fasten seatbelt. [] [] [] []   Pt will improve shoulder strength throughout to 5/5 to improve function with OH lifting and reaching behind her back symptom free.  [] [] [] []   Pt will demonstrate increased mid/low trap strength to 5/5 to promote improved shoulder mechanics and stabilization with lifting and reaching. [] [] [] []   Pt will be independent and compliant with comprehensive HEP to maintain progress achieved in PT. [] [] [] []       Frequency / Duration: Patient will be seen for 2 x/week or a total of 8 visits over a 90 day period. Treatment will include: Manual Therapy, Mechanical Traction, Neuromuscular Re-education, Self-Care Home Management, Therapeutic Activities, Therapeutic Exercise, Home Exercise Program instruction, and Modalities to include: as needed for pain modulation.    Education or treatment limitation: None  Rehab Potential:good    QuickDASH Outcome Score  No data recorded    Patient/Family/Caregiver was advised of these findings, precautions, and treatment options and has agreed to actively participate in planning and for this course of care.    Thank you for your referral. Please co-sign or sign and return this letter via fax as soon as  possible to [phone]. If you have any questions, please contact me at Dept: [phone]    Sincerely,  Electronically signed by therapist: Ezio Vargas PT  Physician's certification required: Yes  I certify the need for these services furnished under this plan of treatment and while under my care.    X___________________________________________________ Date____________________    Certification From: 2/18/2025  To:5/19/2025

## 2025-02-19 ENCOUNTER — OFFICE VISIT (OUTPATIENT)
Dept: FAMILY MEDICINE CLINIC | Facility: CLINIC | Age: 60
End: 2025-02-19
Payer: COMMERCIAL

## 2025-02-19 VITALS
HEART RATE: 76 BPM | DIASTOLIC BLOOD PRESSURE: 86 MMHG | SYSTOLIC BLOOD PRESSURE: 136 MMHG | HEIGHT: 65 IN | RESPIRATION RATE: 16 BRPM | BODY MASS INDEX: 30 KG/M2

## 2025-02-19 DIAGNOSIS — M25.512 CHRONIC LEFT SHOULDER PAIN: ICD-10-CM

## 2025-02-19 DIAGNOSIS — N30.00 ACUTE CYSTITIS WITHOUT HEMATURIA: Primary | ICD-10-CM

## 2025-02-19 DIAGNOSIS — G89.29 CHRONIC LEFT SHOULDER PAIN: ICD-10-CM

## 2025-02-19 PROCEDURE — 3008F BODY MASS INDEX DOCD: CPT | Performed by: FAMILY MEDICINE

## 2025-02-19 PROCEDURE — 99214 OFFICE O/P EST MOD 30 MIN: CPT | Performed by: FAMILY MEDICINE

## 2025-02-19 PROCEDURE — 3075F SYST BP GE 130 - 139MM HG: CPT | Performed by: FAMILY MEDICINE

## 2025-02-19 PROCEDURE — G2211 COMPLEX E/M VISIT ADD ON: HCPCS | Performed by: FAMILY MEDICINE

## 2025-02-19 PROCEDURE — 3079F DIAST BP 80-89 MM HG: CPT | Performed by: FAMILY MEDICINE

## 2025-02-19 RX ORDER — FLUTICASONE PROPIONATE AND SALMETEROL 250; 50 UG/1; UG/1
1 POWDER RESPIRATORY (INHALATION) 2 TIMES DAILY
Qty: 60 EACH | Refills: 3 | Status: SHIPPED | OUTPATIENT
Start: 2025-02-19

## 2025-02-19 NOTE — PROGRESS NOTES
Subjective:   Nae is a 59 year old female coming in for had concerns including Urgent Care F/u (Went in for UTI) and Wheezing (That's been lingering around would like to discuss ).   HPI   Seen [facility] yesterday.   4 months of symptoms. Using athrltico.   Trouble extenindg,   Wheezing last month. Hx asthma, labuterol,   History of Present Illness  Nae Ureña is a 59 year old female who presents with persistent arm pain and wheezing.    She has been experiencing persistent arm pain for the past four months, which began after missing the last step while descending stairs. This incident caused her to lose balance and grab onto the walls, resulting in muscle strain in the biceps, triceps, deltoid area, and possibly the upper shoulder. The pain is described as burning when pressure is applied. She has been attending physical therapy and brought exercises recommended by the therapist for review. Certain movements, such as extending her arm, cause pain, while others do not. No sharp throbbing pain is currently present in her arm.    She reports wheezing that began in early January and has persisted. She has a history of using albuterol, which provides temporary relief. The wheezing is more noticeable at night and does not prevent her from performing daily activities. A possible trigger could be exposure to a friend's bulldog, as she is allergic to dogs, although her own dog does not cause issues. She has been prescribed a new inhaler to use twice daily to manage the wheezing. During a recent urinary tract infection, a healthcare provider noted wheezing in her right lung.    She mentions a recent urinary tract infection for which she received medication.    She is a member of [employer] and exercises regularly, performing 50 minutes of treadmill exercise six days a week. She is allergic to dogs, except for her own hypoallergenic dog.       Subjective:   /86   Pulse 76   Resp 16   Ht 5' 5\" (1.651 m)   BMI  29.62 kg/m²  Body mass index is 29.62 kg/m².   Physical Exam    Physical Exam  CHEST: Wheezing throughout all lung fields.  Good range of motion of the shoulders, pain over the lateral and triceps area on the left shoulder, 5 out of 5 strength upper and lower extremities.  Assessment & Plan  Acute cystitis without hematuria         Chronic left shoulder pain  Dogin PT< doing well.           Other orders    Fluticasone-Salmeterol; Inhale 1 puff into the lungs 2 (two) times daily.  Dispense: 60 each; Refill: 3     Assessment & Plan  Upper extremity strain  Pain and limited extension in the arm after a near fall. Currently undergoing physical therapy with prescribed exercises.  -Continue with prescribed physical therapy exercises.  -Consider heat application for muscle relaxation.    Wheezing  Persistent wheezing, possibly due to a recent cold and exposure to a dog (known allergen). Albuterol provides temporary relief but symptoms persist.  -Start generic Advair (fluticasone/salmeterol) twice daily for one month, then reassess for possible reduction to once daily at night. Rinse mouth after use.  -Continue using albuterol as a rescue inhaler as needed.    Weight Management  Patient is currently exercising regularly but not losing weight. Discussed the role of dietary changes and potential use of GLP-1 agonists for weight loss.  -Encourage calorie and starchy, sugary food restriction for weight loss.  -Discussed potential use of GLP-1 agonists for weight loss, but no immediate plan to start.  I am having Nae ReyesUreña start on fluticasone-salmeterol. I am also having her maintain her Coenzyme Q10 (COQ10 OR), vitamin C, albuterol, Omega 3, and albuterol.       Return in about 6 months (around 8/19/2025) for Annual physical, Or sooner if symptoms persist.

## 2025-02-26 ENCOUNTER — APPOINTMENT (OUTPATIENT)
Dept: PHYSICAL THERAPY | Age: 60
End: 2025-02-26
Attending: FAMILY MEDICINE
Payer: COMMERCIAL

## 2025-03-03 ENCOUNTER — APPOINTMENT (OUTPATIENT)
Dept: PHYSICAL THERAPY | Age: 60
End: 2025-03-03
Attending: FAMILY MEDICINE
Payer: COMMERCIAL

## 2025-03-05 ENCOUNTER — APPOINTMENT (OUTPATIENT)
Dept: PHYSICAL THERAPY | Age: 60
End: 2025-03-05
Attending: FAMILY MEDICINE
Payer: COMMERCIAL

## 2025-03-10 ENCOUNTER — APPOINTMENT (OUTPATIENT)
Dept: PHYSICAL THERAPY | Age: 60
End: 2025-03-10
Attending: FAMILY MEDICINE
Payer: COMMERCIAL

## 2025-03-12 ENCOUNTER — APPOINTMENT (OUTPATIENT)
Dept: PHYSICAL THERAPY | Age: 60
End: 2025-03-12
Attending: FAMILY MEDICINE
Payer: COMMERCIAL

## 2025-03-24 ENCOUNTER — OFFICE VISIT (OUTPATIENT)
Dept: FAMILY MEDICINE CLINIC | Facility: CLINIC | Age: 60
End: 2025-03-24
Payer: COMMERCIAL

## 2025-03-24 VITALS
DIASTOLIC BLOOD PRESSURE: 88 MMHG | SYSTOLIC BLOOD PRESSURE: 138 MMHG | RESPIRATION RATE: 16 BRPM | TEMPERATURE: 97 F | HEIGHT: 65.5 IN | BODY MASS INDEX: 29 KG/M2 | HEART RATE: 71 BPM | OXYGEN SATURATION: 99 %

## 2025-03-24 DIAGNOSIS — R39.9 UTI SYMPTOMS: ICD-10-CM

## 2025-03-24 DIAGNOSIS — N30.00 ACUTE CYSTITIS WITHOUT HEMATURIA: Primary | ICD-10-CM

## 2025-03-24 LAB
BILIRUBIN: NEGATIVE
GLUCOSE (URINE DIPSTICK): NEGATIVE MG/DL
KETONES (URINE DIPSTICK): NEGATIVE MG/DL
MULTISTIX LOT#: ABNORMAL NUMERIC
NITRITE, URINE: NEGATIVE
PH, URINE: 7 (ref 4.5–8)
SPECIFIC GRAVITY: 1.02 (ref 1–1.03)
URINE-COLOR: YELLOW
UROBILINOGEN,SEMI-QN: 0.2 MG/DL (ref 0–1.9)

## 2025-03-24 PROCEDURE — 87186 SC STD MICRODIL/AGAR DIL: CPT | Performed by: FAMILY MEDICINE

## 2025-03-24 PROCEDURE — 87077 CULTURE AEROBIC IDENTIFY: CPT | Performed by: FAMILY MEDICINE

## 2025-03-24 PROCEDURE — 87086 URINE CULTURE/COLONY COUNT: CPT | Performed by: FAMILY MEDICINE

## 2025-03-24 RX ORDER — CEPHALEXIN 500 MG/1
500 CAPSULE ORAL 2 TIMES DAILY
Qty: 14 CAPSULE | Refills: 0 | Status: SHIPPED | OUTPATIENT
Start: 2025-03-24 | End: 2025-03-31

## 2025-03-24 NOTE — PROGRESS NOTES
Nae Ureña is a 59 year old female.  Chief Complaint   Patient presents with    UTI     Started last night, cloudy urine, mild pressure, no otc       HPI:   Patient presents with symptoms of UTI since last night.  Complaining of urinary frequency, urgency, dysuria, no suprapubic pain but mild pressure sensation.  Denies back pain, fever, hematuria.  Pt has history of UTI in past.  2/3 treated with amox, but had to be changed to keflex.   Current Outpatient Medications   Medication Sig Dispense Refill    cephALEXin 500 MG Oral Cap Take 1 capsule (500 mg total) by mouth 2 (two) times daily for 7 days. 14 capsule 0    fluticasone-salmeterol 250-50 MCG/ACT Inhalation Aerosol Powder, Breath Activated Inhale 1 puff into the lungs 2 (two) times daily. 60 each 3    albuterol (PROAIR HFA) 108 (90 Base) MCG/ACT Inhalation Aero Soln Inhale 1-2 puffs into the lungs every 4 (four) hours as needed for Wheezing or Shortness of Breath. 1 each 0    Omega 3 1000 MG Oral Cap Take by mouth.      albuterol (PROAIR HFA) 108 (90 Base) MCG/ACT Inhalation Aero Soln Inhale 2 puffs into the lungs every 4 (four) hours as needed for Wheezing. 1 each 1    Ascorbic Acid (VITAMIN C) 1000 MG Oral Tab Take 1 tablet (1,000 mg total) by mouth daily.      Coenzyme Q10 (COQ10 OR) Take by mouth.       No current facility-administered medications for this visit.      No past medical history on file.   Social History:  Social History     Socioeconomic History    Marital status:    Occupational History    Occupation:      Employer: [employer]     Comment: [employer]   Tobacco Use    Smoking status: Never    Smokeless tobacco: Never   Vaping Use    Vaping status: Never Used   Substance and Sexual Activity    Alcohol use: Yes     Alcohol/week: 3.0 standard drinks of alcohol     Types: 3 Standard drinks or equivalent per week     Comment: occassionally    Drug use: No   Other Topics Concern    Caffeine Concern Yes      Comment: 1 cups coffee daily    Exercise Yes     Comment: 3-4x a week     Seat Belt Yes     Social Drivers of Health     Food Insecurity: No Food Insecurity (2/19/2025)    NCSS - Food Insecurity     Worried About Running Out of Food in the Last Year: No     Ran Out of Food in the Last Year: No   Transportation Needs: No Transportation Needs (2/19/2025)    NCSS - Transportation     Lack of Transportation: No   Housing Stability: Not At Risk (2/19/2025)    NCSS - Housing/Utilities     Has Housing: Yes     Worried About Losing Housing: No     Unable to Get Utilities: No         REVIEW OF SYSTEMS:   GENERAL HEALTH: no fever/chills or fatigue  SKIN: denies any unusual skin lesions or rashes  RESPIRATORY: no shortness of breath with exertion  CARDIOVASCULAR: denies chest pain on exertion and palpitations.  GI: no nausea or vomiting  : as above.  NEURO: denies headaches or dizziness.    EXAM:   /88   Pulse 71   Temp 97.1 °F (36.2 °C)   Resp 16   Ht 5' 5.5\" (1.664 m)   SpO2 99%   BMI 29.17 kg/m²   GENERAL: well developed, well nourished,in no apparent distress, pleasant pt.  SKIN: no rashes,no suspicious lesions  LUNG: Clear to auscultation bilaterally. No W/R/R.  HEART: RRR, no murmur.  GI: normoactive BS x4, no masses, HSM;  mild suprapubic tenderness, no CVAT    RESULTS:      Recent Results (from the past 24 hours)   URINALYSIS, AUTO, W/O SCOPE    Collection Time: 03/24/25  9:33 AM   Result Value Ref Range    Glucose Urine Negative Negative mg/dL    Bilirubin Urine Negative Negative    Ketones, UA Negative Negative - Trace mg/dL    Spec Gravity 1.020 1.005 - 1.030    Blood Urine Small (A) Negative    PH Urine 7.0 5.0 - 8.0    Protein Urine Trace Negative - Trace mg/dL    Urobilinogen Urine 0.2 0.2 - 1.0 mg/dL    Nitrite Urine Negative Negative    Leukocyte Esterase Urine Moderate (A) Negative    APPEARANCE cloudy Clear    Color Urine yellow Yellow    Multistix Lot# [id number] Numeric    Multistix Expiration  Date 10/31/25 Date       ASSESSMENT AND PLAN:     Encounter Diagnoses   Name Primary?    Acute cystitis without hematuria Yes    UTI symptoms        Orders Placed This Encounter   Procedures    URINALYSIS, AUTO, W/O SCOPE    Urine Culture, Routine       Meds & Refills for this Visit:  Requested Prescriptions     Signed Prescriptions Disp Refills    cephALEXin 500 MG Oral Cap 14 capsule 0     Sig: Take 1 capsule (500 mg total) by mouth 2 (two) times daily for 7 days.         Patient Instructions   Take antibiotics with food and plenty of water.  Eat yogurt daily or use probiotics. (Align is a good example of an OTC probiotic)  Make sure to finish the entire antibiotic treatment.  We will send the urine specimen for culture and call you in 2-3 days with results  You may take otc pyridium for urinary discomfort if needed.   Increase fluid intake and bladder emptying.  Return in 3 days if not better. Call if fever, vomiting, worsening symptoms.    The patient indicates understanding of these issues and agrees to the plan.

## 2025-03-24 NOTE — PATIENT INSTRUCTIONS
Take antibiotics with food and plenty of water.  Eat yogurt daily or use probiotics. (Align is a good example of an OTC probiotic)  Make sure to finish the entire antibiotic treatment.  We will send the urine specimen for culture and call you in 2-3 days with results  You may take otc pyridium for urinary discomfort if needed.   Increase fluid intake and bladder emptying.  Return in 3 days if not better. Call if fever, vomiting, worsening symptoms.

## 2025-07-07 ENCOUNTER — OFFICE VISIT (OUTPATIENT)
Dept: FAMILY MEDICINE CLINIC | Facility: CLINIC | Age: 60
End: 2025-07-07
Payer: COMMERCIAL

## 2025-07-07 VITALS
WEIGHT: 173 LBS | RESPIRATION RATE: 18 BRPM | OXYGEN SATURATION: 98 % | HEIGHT: 65.5 IN | SYSTOLIC BLOOD PRESSURE: 148 MMHG | DIASTOLIC BLOOD PRESSURE: 90 MMHG | HEART RATE: 74 BPM | TEMPERATURE: 97 F | BODY MASS INDEX: 28.48 KG/M2

## 2025-07-07 DIAGNOSIS — Z12.31 VISIT FOR SCREENING MAMMOGRAM: Primary | ICD-10-CM

## 2025-07-07 DIAGNOSIS — R39.9 UTI SYMPTOMS: Primary | ICD-10-CM

## 2025-07-07 LAB
BILIRUBIN: NEGATIVE
GLUCOSE (URINE DIPSTICK): NEGATIVE MG/DL
KETONES (URINE DIPSTICK): NEGATIVE MG/DL
MULTISTIX LOT#: ABNORMAL NUMERIC
NITRITE, URINE: NEGATIVE
PH, URINE: 6 (ref 4.5–8)
PROTEIN (URINE DIPSTICK): 30 MG/DL
SPECIFIC GRAVITY: 1.02 (ref 1–1.03)
URINE-COLOR: YELLOW
UROBILINOGEN,SEMI-QN: 0.2 MG/DL (ref 0–1.9)

## 2025-07-07 PROCEDURE — 87086 URINE CULTURE/COLONY COUNT: CPT | Performed by: FAMILY MEDICINE

## 2025-07-07 PROCEDURE — 87088 URINE BACTERIA CULTURE: CPT | Performed by: FAMILY MEDICINE

## 2025-07-07 PROCEDURE — 87186 SC STD MICRODIL/AGAR DIL: CPT | Performed by: FAMILY MEDICINE

## 2025-07-07 RX ORDER — CEPHALEXIN 500 MG/1
500 CAPSULE ORAL 2 TIMES DAILY
Qty: 14 CAPSULE | Refills: 0 | Status: SHIPPED | OUTPATIENT
Start: 2025-07-07 | End: 2025-07-14

## 2025-07-07 NOTE — PROGRESS NOTES
Nae Ureña is a 60 year old female.  Chief Complaint   Patient presents with    Urinary Symptoms     Symptoms started this morning: burning sensation and bladder pressure   OTC: none ( only probiotic )        HPI:   Patient presents with symptoms of UTI since this morning.  Complaining of urinary frequency, urgency, dysuria. Denies suprapubic pain.   Denies back pain, fever, hematuria.  Pt has recent history of UTI in past.  Last one 4 months ago. Treated with keflex.   Current Outpatient Medications   Medication Sig Dispense Refill    cephALEXin 500 MG Oral Cap Take 1 capsule (500 mg total) by mouth 2 (two) times daily for 7 days. 14 capsule 0    fluticasone-salmeterol 250-50 MCG/ACT Inhalation Aerosol Powder, Breath Activated Inhale 1 puff into the lungs 2 (two) times daily. 60 each 3    albuterol (PROAIR HFA) 108 (90 Base) MCG/ACT Inhalation Aero Soln Inhale 1-2 puffs into the lungs every 4 (four) hours as needed for Wheezing or Shortness of Breath. 1 each 0    Omega 3 1000 MG Oral Cap Take by mouth.      albuterol (PROAIR HFA) 108 (90 Base) MCG/ACT Inhalation Aero Soln Inhale 2 puffs into the lungs every 4 (four) hours as needed for Wheezing. 1 each 1    Ascorbic Acid (VITAMIN C) 1000 MG Oral Tab Take 1 tablet (1,000 mg total) by mouth daily.      Coenzyme Q10 (COQ10 OR) Take by mouth.       No current facility-administered medications for this visit.      Past Medical History[1]   Social History:  Short Social Hx on File[2]      REVIEW OF SYSTEMS:   GENERAL HEALTH: no fever/chills or fatigue  SKIN: denies any unusual skin lesions or rashes  RESPIRATORY: no shortness of breath with exertion  CARDIOVASCULAR: denies chest pain on exertion and palpitations.  GI: no nausea or vomiting  : as above.  NEURO: denies headaches or dizziness.    EXAM:   /90 (BP Location: Left arm, Patient Position: Sitting, Cuff Size: adult)   Pulse 74   Temp 97.1 °F (36.2 °C) (Temporal)   Resp 18   Ht 5' 5.5\" (1.664 m)    Wt 173 lb (78.5 kg)   SpO2 98%   BMI 28.35 kg/m²   GENERAL: well developed, well nourished,in no apparent distress, pleasant pt.  SKIN: no rashes,no suspicious lesions  LUNG: Clear to auscultation bilaterally. No W/R/R.  HEART: RRR, no murmur.  GI: normoactive BS x4, no masses, HSM; no suprapubic tenderness, no CVAT    RESULTS:      Recent Results (from the past 24 hours)   URINALYSIS, AUTO, W/O SCOPE    Collection Time: 07/07/25  9:38 AM   Result Value Ref Range    Glucose Urine Negative Negative mg/dL    Bilirubin Urine Negative Negative    Ketones, UA Negative Negative - Trace mg/dL    Spec Gravity 1.025 1.005 - 1.030    Blood Urine Moderate (A) Negative    PH Urine 6.0 5.0 - 8.0    Protein Urine 30 (A) Negative - Trace mg/dL    Urobilinogen Urine 0.2 0.2 - 1.0 mg/dL    Nitrite Urine Negative Negative    Leukocyte Esterase Urine Small (A) Negative    APPEARANCE cloudy Clear    Color Urine yellow Yellow    Multistix Lot# [id number] Numeric    Multistix Expiration Date 3-31-26 Date       ASSESSMENT AND PLAN:     Encounter Diagnosis   Name Primary?    UTI symptoms Yes       Orders Placed This Encounter   Procedures    URINALYSIS, AUTO, W/O SCOPE    Urine Culture, Routine       Meds & Refills for this Visit:  Requested Prescriptions     Signed Prescriptions Disp Refills    cephALEXin 500 MG Oral Cap 14 capsule 0     Sig: Take 1 capsule (500 mg total) by mouth 2 (two) times daily for 7 days.         Patient Instructions   Take antibiotics with food and plenty of water.  Eat yogurt daily or use probiotics. (Align is a good example of an OTC probiotic)  Make sure to finish the entire antibiotic treatment.  We will send the urine specimen for culture and call you in 2-3 days with results  You may take otc pyridium for urinary discomfort if needed.   Increase fluid intake and bladder emptying, especially after intercourse.   Return in 3 days if not better. Call if fever, vomiting, worsening symptoms.    The patient  indicates understanding of these issues and agrees to the plan.       [1] No past medical history on file.  [2]   Social History  Socioeconomic History    Marital status:    Occupational History    Occupation:      Employer: [employer]     Comment: [employer]   Tobacco Use    Smoking status: Never    Smokeless tobacco: Never   Vaping Use    Vaping status: Never Used   Substance and Sexual Activity    Alcohol use: Yes     Alcohol/week: 3.0 standard drinks of alcohol     Types: 3 Standard drinks or equivalent per week     Comment: occassionally    Drug use: No   Other Topics Concern    Caffeine Concern Yes     Comment: 1 cups coffee daily    Exercise Yes     Comment: 3-4x a week     Seat Belt Yes     Social Drivers of Health     Food Insecurity: No Food Insecurity (2/19/2025)    NCSS - Food Insecurity     Worried About Running Out of Food in the Last Year: No     Ran Out of Food in the Last Year: No   Transportation Needs: No Transportation Needs (2/19/2025)    NCSS - Transportation     Lack of Transportation: No   Housing Stability: Not At Risk (2/19/2025)    NCSS - Housing/Utilities     Has Housing: Yes     Worried About Losing Housing: No     Unable to Get Utilities: No

## 2025-07-07 NOTE — PATIENT INSTRUCTIONS
Take antibiotics with food and plenty of water.  Eat yogurt daily or use probiotics. (Align is a good example of an OTC probiotic)  Make sure to finish the entire antibiotic treatment.  We will send the urine specimen for culture and call you in 2-3 days with results  You may take otc pyridium for urinary discomfort if needed.   Increase fluid intake and bladder emptying, especially after intercourse.   Return in 3 days if not better. Call if fever, vomiting, worsening symptoms.

## 2025-07-09 ENCOUNTER — HOSPITAL ENCOUNTER (OUTPATIENT)
Dept: MAMMOGRAPHY | Age: 60
Discharge: HOME | End: 2025-07-09
Attending: FAMILY MEDICINE

## 2025-07-09 DIAGNOSIS — Z12.31 VISIT FOR SCREENING MAMMOGRAM: ICD-10-CM

## 2025-07-09 PROCEDURE — 77067 SCR MAMMO BI INCL CAD: CPT

## 2025-07-16 ENCOUNTER — APPOINTMENT (OUTPATIENT)
Dept: MAMMOGRAPHY | Age: 60
End: 2025-07-16
Attending: FAMILY MEDICINE

## 2025-07-21 ENCOUNTER — TELEPHONE (OUTPATIENT)
Dept: FAMILY MEDICINE CLINIC | Facility: CLINIC | Age: 60
End: 2025-07-21

## 2025-07-21 NOTE — TELEPHONE ENCOUNTER
Patient is calling she keeps having re-occurring UTI's she has been going to the [facility] to get tested she gets the medication and finishes it and then gets another one right after. Please call to advise what we recommend.

## 2025-07-21 NOTE — TELEPHONE ENCOUNTER
Spoke with patient, states she went to urgent care and had  received antibiotics for UTI. Patient has completed antibiotics but still has symptoms of urgency and pressure. Patient scheduled to see available provider this week.

## 2025-07-23 ENCOUNTER — OFFICE VISIT (OUTPATIENT)
Dept: FAMILY MEDICINE CLINIC | Facility: CLINIC | Age: 60
End: 2025-07-23
Payer: COMMERCIAL

## 2025-07-23 VITALS
HEART RATE: 76 BPM | SYSTOLIC BLOOD PRESSURE: 134 MMHG | HEIGHT: 65.5 IN | BODY MASS INDEX: 28 KG/M2 | DIASTOLIC BLOOD PRESSURE: 90 MMHG | RESPIRATION RATE: 16 BRPM | OXYGEN SATURATION: 96 %

## 2025-07-23 DIAGNOSIS — N95.2 VAGINAL ATROPHY: ICD-10-CM

## 2025-07-23 DIAGNOSIS — R39.9 UTI SYMPTOMS: Primary | ICD-10-CM

## 2025-07-23 DIAGNOSIS — N39.0 RECURRENT UTI: ICD-10-CM

## 2025-07-23 LAB
BILIRUBIN: NEGATIVE
GLUCOSE (URINE DIPSTICK): NEGATIVE MG/DL
KETONES (URINE DIPSTICK): NEGATIVE MG/DL
MULTISTIX LOT#: ABNORMAL NUMERIC
NITRITE, URINE: NEGATIVE
OCCULT BLOOD: NEGATIVE
PH, URINE: 7 (ref 4.5–8)
PROTEIN (URINE DIPSTICK): 30 MG/DL
SPECIFIC GRAVITY: 1.02 (ref 1–1.03)
URINE-COLOR: YELLOW
UROBILINOGEN,SEMI-QN: 0.2 MG/DL (ref 0–1.9)

## 2025-07-23 PROCEDURE — 3075F SYST BP GE 130 - 139MM HG: CPT | Performed by: PHYSICIAN ASSISTANT

## 2025-07-23 PROCEDURE — 3008F BODY MASS INDEX DOCD: CPT | Performed by: PHYSICIAN ASSISTANT

## 2025-07-23 PROCEDURE — 81003 URINALYSIS AUTO W/O SCOPE: CPT | Performed by: PHYSICIAN ASSISTANT

## 2025-07-23 PROCEDURE — 3080F DIAST BP >= 90 MM HG: CPT | Performed by: PHYSICIAN ASSISTANT

## 2025-07-23 PROCEDURE — 99214 OFFICE O/P EST MOD 30 MIN: CPT | Performed by: PHYSICIAN ASSISTANT

## 2025-07-23 RX ORDER — ESTRADIOL 0.1 MG/G
CREAM VAGINAL
Qty: 42.5 G | Refills: 0 | Status: SHIPPED | OUTPATIENT
Start: 2025-07-23

## 2025-07-23 RX ORDER — SULFAMETHOXAZOLE AND TRIMETHOPRIM 800; 160 MG/1; MG/1
1 TABLET ORAL 2 TIMES DAILY
Qty: 10 TABLET | Refills: 0 | Status: SHIPPED | OUTPATIENT
Start: 2025-07-23 | End: 2025-07-28

## 2025-07-24 ENCOUNTER — TELEPHONE (OUTPATIENT)
Dept: FAMILY MEDICINE CLINIC | Facility: CLINIC | Age: 60
End: 2025-07-24

## 2025-07-28 ENCOUNTER — TELEPHONE (OUTPATIENT)
Dept: FAMILY MEDICINE CLINIC | Facility: CLINIC | Age: 60
End: 2025-07-28

## 2025-07-28 DIAGNOSIS — R30.0 DYSURIA: Primary | ICD-10-CM

## 2025-07-28 NOTE — TELEPHONE ENCOUNTER
Spoke with patient, patient states she has a symptom of pressure in her bladder. She would like to repeat UA/Culture. Patient would like nurses to call back. Please advise.

## 2025-07-28 NOTE — TELEPHONE ENCOUNTER
Pt wants clarification from msg sent by Carter cavanaugh in regard to the urine test for her UTI. Finished antibiotic today. Should she be seen this week? Please call. See mao msg below.            We were not able to run a culture off of your urine sample. If your symptoms have improved, we can monitor from here. If you still feel that you have urinary symptoms, we should recheck your urine after you finish the antibiotic and see how it looks (I can order to the lab, just let me know).     Carter

## 2025-07-28 NOTE — PROGRESS NOTES
Subjective:   Nae Ureña is a 60 year old female who presents for UTI (Patient here for UTI symptoms )     History/Other:   History of Present Illness  Nae Ureña is a 60 year old female with recurrent urinary tract infections who presents with urinary frequency and concerns about persistent symptoms.    She has been experiencing recurrent urinary tract infections (UTIs), with the most recent episode occurring a couple of days ago. She notes frequent urination, urinating five times before noon. No pressure or burning sensation is present, and her urine does not appear cloudy. She has been increasing her water intake to help flush out the infection.    Her UTIs have been caused by E. coli. She has been diligent in taking her antibiotics as prescribed, although the timing of doses may occasionally vary. She has never missed a dose. Her current medications include antibiotics, with a recent prescription of Keflex. She has no known allergies to medications, although she expresses concern about Bactrim due to her daughter's allergic reaction to it.    She works as a  and consumes cranberry juice regularly. She reports occasional urinary incontinence with coughing, laughing, or sneezing, and was informed of some prolapse during a pelvic exam last year. She is postmenopausal and occasionally experiences hot flashes. She is concerned about the presence of protein in her urine.     Chief Complaint Reviewed and Verified  Nursing Notes Reviewed and   Verified  Tobacco Reviewed  Allergies Reviewed  Medications Reviewed    Problem List Reviewed  Medical History Reviewed  Surgical History   Reviewed  Family History Reviewed         Tobacco:  She has never smoked tobacco.    Current Medications[1]      Objective:   /90   Pulse 76   Resp 16   Ht 5' 5.5\" (1.664 m)   SpO2 96%   BMI 28.35 kg/m²  Estimated body mass index is 28.35 kg/m² as calculated from the following:    Height as of this  encounter: 5' 5.5\" (1.664 m).    Weight as of 7/7/25: 173 lb (78.5 kg).  Results  LABS  Urine Culture: Escherichia coli 7/7    PATHOLOGY  Pap Smear: Normal     Physical Exam  GENERAL: Alert, cooperative, well developed, no acute distress  CHEST: Clear to auscultation bilaterally, no wheezes, rhonchi, or crackles  CARDIOVASCULAR: Normal heart rate and rhythm, S1 and S2 normal without murmurs  ABDOMEN: Soft, non-tender, non-distended, without organomegaly, normal bowel sounds        Assessment & Plan:   1. UTI symptoms (Primary)  -     Urine Dip, auto without Micro  2. Recurrent UTI  -     US KIDNEY/BLADDER (CPT=76770); Future; Expected date: 07/23/2025  Other orders  -     Estradiol; Apply 1 g intravaginally once daily x 1 week then twice weekly for maintenance.  Dispense: 42.5 g; Refill: 0  -     Sulfamethoxazole-Trimethoprim; Take 1 tablet by mouth 2 (two) times daily for 5 days.  Dispense: 10 tablet; Refill: 0    Assessment & Plan  Recurrent Urinary Tract Infections (UTIs)  She experiences recurrent UTIs, with the latest episode a few days ago. Symptoms include frequent urination without dysuria or cloudy urine. E. coli is the causative agent, and she adheres to antibiotic regimens. Postmenopausal vaginal atrophy may contribute to these infections. She is eligible for vaginal estrogen therapy due to the absence of thrombotic or bleeding disorders. Vaginal estrogen can enhance vaginal tissue health, reducing UTI recurrence risk, hospitalization, and sepsis.  - Prescribe vaginal estrogen cream: Apply one gram intravaginally once daily for one week, then twice weekly for maintenance.  - Prescribe Bactrim (trimethoprim-sulfamethoxazole) for five days, twice daily, with the option to stop after three days if symptoms improve.  - Order renal and bladder ultrasound to rule out underlying kidney issues.  - Advise monitoring for allergic reactions to Bactrim, such as rash, and discontinue if symptoms occur.  - Discuss  potential side effects of Bactrim, including rash, and advise avoiding alcohol if it causes discomfort.  - Educate on vaginal estrogen application, emphasizing intravaginal and external skin application.  - Addm: unable to send for culture after visit so will have her return to lab if sxs persist    Vaginal Atrophy  Postmenopausal estrogen decline leads to vaginal atrophy, contributing to recurrent UTIs and potential pelvic floor issues like prolapse. Vaginal estrogen therapy is recommended to improve tissue health and reduce UTI risk.  - Prescribe vaginal estrogen cream as detailed above.  - Educate on the benefits of vaginal estrogen in reducing UTI risk and improving vaginal tissue health.          RAVEN Lucas           [1]   Current Outpatient Medications   Medication Sig Dispense Refill    estradiol 0.1 MG/GM Vaginal Cream Apply 1 g intravaginally once daily x 1 week then twice weekly for maintenance. 42.5 g 0    sulfamethoxazole-trimethoprim -160 MG Oral Tab per tablet Take 1 tablet by mouth 2 (two) times daily for 5 days. 10 tablet 0    fluticasone-salmeterol 250-50 MCG/ACT Inhalation Aerosol Powder, Breath Activated Inhale 1 puff into the lungs 2 (two) times daily. 60 each 3    Omega 3 1000 MG Oral Cap Take by mouth.      albuterol (PROAIR HFA) 108 (90 Base) MCG/ACT Inhalation Aero Soln Inhale 2 puffs into the lungs every 4 (four) hours as needed for Wheezing. 1 each 1    Ascorbic Acid (VITAMIN C) 1000 MG Oral Tab Take 1 tablet (1,000 mg total) by mouth daily.      Coenzyme Q10 (COQ10 OR) Take by mouth.

## 2025-08-11 ENCOUNTER — TELEPHONE (OUTPATIENT)
Dept: FAMILY MEDICINE CLINIC | Facility: CLINIC | Age: 60
End: 2025-08-11

## 2025-08-11 DIAGNOSIS — R35.0 URINARY FREQUENCY: Primary | ICD-10-CM

## 2025-08-14 LAB
APPEARANCE: CLEAR
BILIRUBIN: NEGATIVE
COLOR: YELLOW
GLUCOSE: NEGATIVE
KETONES: NEGATIVE
NITRITE: NEGATIVE
OCCULT BLOOD: NEGATIVE
PH: 5.5 (ref 5–8)
PROTEIN: NEGATIVE
SPECIFIC GRAVITY: 1.02 (ref 1–1.03)

## 2025-08-25 ENCOUNTER — HOSPITAL ENCOUNTER (OUTPATIENT)
Dept: ULTRASOUND IMAGING | Age: 60
Discharge: HOME | End: 2025-08-25
Attending: PHYSICIAN ASSISTANT

## 2025-08-25 DIAGNOSIS — N39.0 RECURRENT UTI: ICD-10-CM

## 2025-08-25 PROCEDURE — 76770 US EXAM ABDO BACK WALL COMP: CPT | Performed by: PHYSICIAN ASSISTANT

## (undated) NOTE — LETTER
Date & Time: 11/19/2022, 7:37 AM  Patient: Yeny Oropeza  Encounter Provider(s):    Panchito Pedroza MD       To Whom It May Concern:    Adria Norwood was seen and treated in our department on 11/19/2022. She should not return to work until 11-21-22.     If you have any questions or concerns, please do not hesitate to call.        _____________________________  Physician/APC Signature

## (undated) NOTE — MR AVS SNAPSHOT
[facility] [place]  [place],  [address]  [address] [place] [address] [id number]               Thank you for choosing us for your health care visit with Madhavi Vargas MD.  We are glad to serve you and happy to provide you with this summa Ref Range 6 - 22 (calc) Status: Final   SODIUM Date: 05/10/2017   Value: 139  Ref Range 135 - 146 mmol/L Status: Final   POTASSIUM Date: 05/10/2017   Value: 4.7  Ref Range 3.5 - 5.3 mmol/L Status: Final   CHLORIDE Date: 05/10/2017   Value: 103  Ref Range Value: 5.7  Ref Range 3.8 - 10.8 Thousand/uL Status: Final   RED BLOOD CELL COUNT Date: 05/10/2017   Value: 4.56  Ref Range 3.80 - 5.10 Million/uL Status: Final   HEMOGLOBIN Date: 05/10/2017   Value: 13.0  Ref Range 11.7 - 15.5 g/dL Status: Final   HEMATOC Allergies as of May 17, 2017     No Known Allergies                Today's Vital Signs     BP Pulse Temp Height Weight BMI    124/72 mmHg 80 97.8 °F (36.6 °C) 64\" 182 lb 31.22 kg/m2    Breastfeeding?                    No              Current Medications Get your heart pumping – brisk walking, biking, swimming Even 10 minute increments are effective and add up over the week   2 ½ hours per week – spread out over time Use a karine to keep you motivated   Don’t forget strength training with weights and resist

## (undated) NOTE — LETTER
[address] [facility], [address] [facility], [address] [facility], [address] [place], [place] [address]  [facility] [address] [facility] [address]     4/20/2021      Vonda Monreal  [address] [place] [address]    4/13/1965      Dear Vonda Monreal,    Breast cancer